# Patient Record
Sex: FEMALE | Race: WHITE | Employment: FULL TIME | URBAN - NONMETROPOLITAN AREA
[De-identification: names, ages, dates, MRNs, and addresses within clinical notes are randomized per-mention and may not be internally consistent; named-entity substitution may affect disease eponyms.]

---

## 2020-12-18 NOTE — PROGRESS NOTES
spond,         Review of Systems    Review of Systems   Constitutional: Positive for fatigue. Negative for diaphoresis and fever. HENT: Negative for congestion, hearing loss and nosebleeds. Eyes: Negative. Negative for pain and redness. Respiratory: Negative for cough, shortness of breath and wheezing. Cardiovascular: Negative. Negative for chest pain. Gastrointestinal: Negative for constipation, diarrhea, nausea and vomiting. Genitourinary: Negative for difficulty urinating, frequency and hematuria. Musculoskeletal: Positive for arthralgias, joint swelling and myalgias. Skin: Negative for rash. Neurological: Positive for weakness. Negative for dizziness, numbness and headaches. Hematological: Does not bruise/bleed easily. Psychiatric/Behavioral: Negative for agitation and sleep disturbance. The patient is nervous/anxious. PAST MEDICAL HISTORY     has no past medical history on file. PAST SURGICAL HISTORY     has a past surgical history that includes Hysterectomy, total abdominal (2006). FAMILY HISTORY      Family History   Problem Relation Age of Onset    Heart Disease Father        SOCIAL HISTORY     reports that she has never smoked. She has never used smokeless tobacco. She reports that she does not drink alcohol or use drugs. ALLERGIES     Allergies   Allergen Reactions    Latex     Asa [Aspirin]      Restless itchy    Codeine      shakes       CURRENT MEDICATIONS      Current Outpatient Medications:     gabapentin (NEURONTIN) 400 MG capsule, Take 400 mg by mouth 3 times daily. , Disp: , Rfl:     venlafaxine (EFFEXOR XR) 75 MG extended release capsule, Take 75 mg by mouth daily, Disp: , Rfl:     estradiol (ESTRACE) 2 MG tablet, Take 2 mg by mouth daily, Disp: , Rfl:     bisoprolol (ZEBETA) 5 MG tablet, Take 5 mg by mouth daily, Disp: , Rfl:     Multiple Vitamins-Minerals (THERAPEUTIC MULTIVITAMIN-MINERALS) tablet, Take 1 tablet by mouth daily, Disp: , Rfl:   naproxen (NAPROSYN) 250 MG tablet, Take 250 mg by mouth 3 times daily as needed for Pain, Disp: , Rfl:     PHYSICAL EXAMINATION / OBJECTIVE     Objective:  /64 (Site: Left Upper Arm, Position: Sitting, Cuff Size: Medium Adult)   Pulse 62   Temp 96.5 °F (35.8 °C)   Wt 122 lb (55.3 kg)   SpO2 99%     General Appearance:   alert and oriented to person, place and time well-developed and well nourished  Physch : appropriate affects ,   Head:  Normocephalic and atraumatic  Eyes: No gross abnormalities. ,  PERRL, Sclera nonicteric, conjunctiva non-INJECTED  ENT:  MMM,  no deformities , NO oral/nasal sores, Non-tender sinuses. Neck:  Neck supple, Non-tender, No  mass, thyromegaly,    Lymph:  No cervical  or  supraclavicular lymph node swelling. Pulmonary/Chest:  CTA bilat. , normal air movement, no respiratory distress  Cardiovascular:  Normal rate, + S1 and S2,  NO murmurs , rubs, gallups,     :  Deferred   Abd/GI: Deferred   Neurologic:  gait and coordination normal and speech normal  Skin:  Cyanosis , redness,palor of hands and feet    hand type skin changes bilat. Nail capillary dilation left 3,4 finger. Extremities:  No clubbing ,     Musculoskeletal:  Intact  ROM bilat upper and lower ext. 4/5 Strength , shoulder, hips. Upper extremities: SHOULDERS tender bilat ,      ELBOWS - tender ,    WRISTS  NT, NS,      HANDS/FINGERS - Tender: MCPs right 1,2 PIPs, right 2,3. DIPs Right 2nd. Lower extremities: HIPS tender bilat, + IGNACIA, FADIR , KNEES  NT, NS, Negative bilateral medial/latearl compression, anterior/posterior draw, Glen and patellar grind testing  , ANKLES NT, NS , FEET :  Tender MTPS bilat, mid sole bilat. + MTP compression bilat. Spine:  C-spine, T-spine & L-spine:   tender ,  ROM   ,   shober,  susan,  Occiput to wall ,  SLR/Cross SLR.       KEY:  Tender :  T  Swelling: S  Non-tender : NT  No swelling: NS           RAPID3 Composite Score MDHAQ (0-10) + Patient pain VAS (0-10): + Patient global assessment VAS (0-10):     12/21/2020 --- RAPID 3:  +  +  =      Remission: <3  Low Disease Activity: <6  Moderate Disease Activity: >=6 and <=12  High Disease Activity: >12    LABS        CBC  No results found for: WBC, RBC, HGB, HCT, MCV, MCH, MCHC, RDW, PLT    CMP  No results found for: CALCIUM, LABALBU, PROT, NA, K, CO2, CL, BUN, CREATININE, ALKPHOS, ALT, AST    HgBA1c: No components found for: HGBA1C    No results found for: TSHFT4, TSH  No results found for: VITD25      No results found for: ANASCRN  No results found for: SSA  No results found for: SSB  No results found for: ANTI-SMITH  No results found for: DSDNAAB   No results found for: ANTIRNP  No results found for: C3, C4  No results found for: CCPAB  No results found for: RF    No components found for: CANCASCRN, APANCASCRN  No results found for: SEDRATE  No results found for: CRP    RADIOLOGY:       ASSESSMENT/PLAN    Assessment   Plan     1. BRANDON positive    2. Fibromyalgia    3. Polyarthralgia    4. Myalgia    5. Other fatigue      1. BRANDON positive 1:80  2. Polyarthralgia  3. Myalgia  4. Other fatigue   -  genearlized arthralgia, myalgia with raynauds type color changes of the hands w/ nail capillary changes, bilateral symm. weakness,  type hand and prior hx of low + BRANDON. Evaluatino for underlying CTD (myositis , rheumatoid arthritis, SLE , Sjogrens) As outlined below. If negative will titrate medications for the fibromyalgia. - CBC Auto Differential; Future  - Comprehensive Metabolic Panel; Future  - Sedimentation Rate; Future  - C-Reactive Protein; Future  - Miscellaneous Sendout 1; Future - myositis ab panel.   - CK; Future  - Aldolase; Future  - Lactate Dehydrogenase; Future  - Hepatitis Panel, Acute; Future    5. Fibromyalgia  - Fibromyalgia is a non-life threatening non-rheumatic disease and is a diagnosis of exclusion.  We Discussed diagnosis, pathophysiology and management options with the patient. - I have discussed with her the importance of aerobic, non impact activity - swimming, yoga, danelle chi, walking or bicycle as well as healthy diet and sleep hygiene. A graded exercise program with physical therapy - We discussed  possible massage therapy and acupuncture. - Various FDA approved treatment such as low dose TCAs, SNRI (cymbalta, savella), gapenetoids, (neurotin, lyrica), flexeril, physical therapy with graded exercise program, cognitive behavioral thearpy. NoN-fda approved medications (muscle relaxants, ssri's) with the patient. - Use of pain medications (opiods/narcotic) can sometimes cause hyperasthesia in patients with FMS and not part of the standard treatment guidelines. - cont. Gabapentin 300mg q AM and 600mg qhs    - effexor daily     - CBC Auto Differential; Future  - Comprehensive Metabolic Panel; Future  - Sedimentation Rate; Future  - C-Reactive Protein; Future  - Miscellaneous Sendout 1; Future  - CK; Future  - Aldolase; Future  - Lactate Dehydrogenase; Future  - Hepatitis Panel, Acute; Future    6. Lateral epicondylitis - right --    - stretches and bracing discussed. Return in about 6 weeks (around 2/1/2021). Electronically signed by Bobby Zheng DO on 12/21/2020 at 10:48 AM    New Prescriptions    No medications on file         The risks and benefits of my recommendations, as well as other treatment options, benefits and side effects were discussed with the patient today. Questions were answered. Thank you for allowing me to participate in the care of this patient. Please call if there are any questions.

## 2020-12-21 ENCOUNTER — NURSE ONLY (OUTPATIENT)
Dept: LAB | Age: 55
End: 2020-12-21

## 2020-12-21 ENCOUNTER — OFFICE VISIT (OUTPATIENT)
Dept: RHEUMATOLOGY | Age: 55
End: 2020-12-21
Payer: COMMERCIAL

## 2020-12-21 VITALS
WEIGHT: 122 LBS | TEMPERATURE: 96.5 F | OXYGEN SATURATION: 99 % | SYSTOLIC BLOOD PRESSURE: 106 MMHG | HEART RATE: 62 BPM | DIASTOLIC BLOOD PRESSURE: 64 MMHG

## 2020-12-21 LAB
ALBUMIN SERPL-MCNC: 4.6 G/DL (ref 3.5–5.1)
ALP BLD-CCNC: 60 U/L (ref 38–126)
ALT SERPL-CCNC: 23 U/L (ref 11–66)
ANION GAP SERPL CALCULATED.3IONS-SCNC: 11 MEQ/L (ref 8–16)
AST SERPL-CCNC: 29 U/L (ref 5–40)
BASOPHILS # BLD: 0.7 %
BASOPHILS ABSOLUTE: 0 THOU/MM3 (ref 0–0.1)
BILIRUB SERPL-MCNC: 0.4 MG/DL (ref 0.3–1.2)
BUN BLDV-MCNC: 12 MG/DL (ref 7–22)
C-REACTIVE PROTEIN: 0.23 MG/DL (ref 0–1)
CALCIUM SERPL-MCNC: 9.8 MG/DL (ref 8.5–10.5)
CHLORIDE BLD-SCNC: 105 MEQ/L (ref 98–111)
CO2: 26 MEQ/L (ref 23–33)
CREAT SERPL-MCNC: 0.6 MG/DL (ref 0.4–1.2)
EOSINOPHIL # BLD: 3.8 %
EOSINOPHILS ABSOLUTE: 0.2 THOU/MM3 (ref 0–0.4)
ERYTHROCYTE [DISTWIDTH] IN BLOOD BY AUTOMATED COUNT: 13.2 % (ref 11.5–14.5)
ERYTHROCYTE [DISTWIDTH] IN BLOOD BY AUTOMATED COUNT: 45.9 FL (ref 35–45)
GFR SERPL CREATININE-BSD FRML MDRD: > 90 ML/MIN/1.73M2
GLUCOSE BLD-MCNC: 89 MG/DL (ref 70–108)
HAV IGM SER IA-ACNC: NEGATIVE
HCT VFR BLD CALC: 42.9 % (ref 37–47)
HEMOGLOBIN: 13.5 GM/DL (ref 12–16)
HEPATITIS B CORE IGM ANTIBODY: NEGATIVE
HEPATITIS B SURFACE ANTIGEN: NEGATIVE
HEPATITIS C ANTIBODY: NEGATIVE
IMMATURE GRANS (ABS): 0.02 THOU/MM3 (ref 0–0.07)
IMMATURE GRANULOCYTES: 0.4 %
LD: 171 U/L (ref 100–190)
LYMPHOCYTES # BLD: 38.5 %
LYMPHOCYTES ABSOLUTE: 1.7 THOU/MM3 (ref 1–4.8)
MCH RBC QN AUTO: 29.8 PG (ref 26–33)
MCHC RBC AUTO-ENTMCNC: 31.5 GM/DL (ref 32.2–35.5)
MCV RBC AUTO: 94.7 FL (ref 81–99)
MONOCYTES # BLD: 8.8 %
MONOCYTES ABSOLUTE: 0.4 THOU/MM3 (ref 0.4–1.3)
NUCLEATED RED BLOOD CELLS: 0 /100 WBC
PLATELET # BLD: 244 THOU/MM3 (ref 130–400)
PMV BLD AUTO: 12.1 FL (ref 9.4–12.4)
POTASSIUM SERPL-SCNC: 4 MEQ/L (ref 3.5–5.2)
RBC # BLD: 4.53 MILL/MM3 (ref 4.2–5.4)
SEDIMENTATION RATE, ERYTHROCYTE: 5 MM/HR (ref 0–20)
SEG NEUTROPHILS: 47.8 %
SEGMENTED NEUTROPHILS ABSOLUTE COUNT: 2.2 THOU/MM3 (ref 1.8–7.7)
SODIUM BLD-SCNC: 142 MEQ/L (ref 135–145)
TOTAL CK: 98 U/L (ref 30–135)
TOTAL PROTEIN: 7.3 G/DL (ref 6.1–8)
WBC # BLD: 4.5 THOU/MM3 (ref 4.8–10.8)

## 2020-12-21 PROCEDURE — G8421 BMI NOT CALCULATED: HCPCS | Performed by: INTERNAL MEDICINE

## 2020-12-21 PROCEDURE — G8484 FLU IMMUNIZE NO ADMIN: HCPCS | Performed by: INTERNAL MEDICINE

## 2020-12-21 PROCEDURE — 99204 OFFICE O/P NEW MOD 45 MIN: CPT | Performed by: INTERNAL MEDICINE

## 2020-12-21 PROCEDURE — G8427 DOCREV CUR MEDS BY ELIG CLIN: HCPCS | Performed by: INTERNAL MEDICINE

## 2020-12-21 RX ORDER — VENLAFAXINE HYDROCHLORIDE 75 MG/1
150 CAPSULE, EXTENDED RELEASE ORAL DAILY
COMMUNITY

## 2020-12-21 RX ORDER — ESTRADIOL 2 MG/1
2 TABLET ORAL DAILY
COMMUNITY

## 2020-12-21 RX ORDER — BISOPROLOL FUMARATE 5 MG/1
5 TABLET ORAL DAILY
COMMUNITY

## 2020-12-21 RX ORDER — NAPROXEN 250 MG/1
250 TABLET ORAL 3 TIMES DAILY PRN
COMMUNITY
End: 2022-08-30

## 2020-12-21 RX ORDER — M-VIT,TX,IRON,MINS/CALC/FOLIC 27MG-0.4MG
1 TABLET ORAL DAILY
COMMUNITY
End: 2022-08-30

## 2020-12-21 RX ORDER — GABAPENTIN 400 MG/1
400 CAPSULE ORAL 3 TIMES DAILY
COMMUNITY
End: 2021-02-03 | Stop reason: SDUPTHER

## 2020-12-21 SDOH — HEALTH STABILITY: MENTAL HEALTH: HOW MANY STANDARD DRINKS CONTAINING ALCOHOL DO YOU HAVE ON A TYPICAL DAY?: NOT ASKED

## 2020-12-21 SDOH — HEALTH STABILITY: MENTAL HEALTH: HOW OFTEN DO YOU HAVE A DRINK CONTAINING ALCOHOL?: NEVER

## 2020-12-21 ASSESSMENT — ENCOUNTER SYMPTOMS
EYE REDNESS: 0
EYE PAIN: 0
NAUSEA: 0
SHORTNESS OF BREATH: 0
CONSTIPATION: 0
EYES NEGATIVE: 1
COUGH: 0
VOMITING: 0
WHEEZING: 0
DIARRHEA: 0

## 2020-12-22 ENCOUNTER — TELEPHONE (OUTPATIENT)
Dept: RHEUMATOLOGY | Age: 55
End: 2020-12-22

## 2020-12-22 NOTE — TELEPHONE ENCOUNTER
----- Message from Kristen Herndon DO sent at 12/22/2020  7:39 AM EST -----  Blood testing revealing a mildly low white blood cell count of the remainder of the tests so far have been unrevealing. A few additional tests needs to help evaluate for systemic lupus and other similar inflammatory conditions are still pending. Once these have returned you will be notified of the results.

## 2020-12-23 LAB — ALDOLASE: 4.3 U/L (ref 1.5–8.1)

## 2020-12-30 NOTE — RESULT ENCOUNTER NOTE
Polyarthralgia  + BRANDON  + SSA ab.   -Polyarthralgia bilateral hands previously noted improvement with Plaquenil. Subsequent serologies revealing strong positive SSA. Raising concern for underlying connective tissue disease. Would like to discuss pursuit of DMARD such as methotrexate to improve clinical symptoms/arthralgias. Adelaida Walter

## 2020-12-31 ENCOUNTER — TELEPHONE (OUTPATIENT)
Dept: RHEUMATOLOGY | Age: 55
End: 2020-12-31

## 2020-12-31 RX ORDER — FOLIC ACID 1 MG/1
1 TABLET ORAL DAILY
Qty: 30 TABLET | Refills: 3 | Status: SHIPPED | OUTPATIENT
Start: 2020-12-31 | End: 2021-03-17 | Stop reason: SDUPTHER

## 2020-12-31 NOTE — TELEPHONE ENCOUNTER
Phoned her and reviewed. She is agreeable to trying the MTX and is aware of lab protocol.      Please sign pended orders

## 2020-12-31 NOTE — TELEPHONE ENCOUNTER
----- Message from Natasha March, DO sent at 12/30/2020  5:18 PM EST -----  Labs with + BRANDON and strong  + SSA Ab. Concern for possible connective tissue disease. Would like to discuss trial of methotrexate given her symptoms and prior improvement with plaquenil prior to developing tinnitus related to the medication. Side effects associated methotrexate include methotrexate induced pneumonitis, liver injury, low blood counts, nausea, vomiting, hair thinning headaches, fatigue, kidney injury. Blood testing every 4 weeks would be needed either initially to evaluate for some of these potential side effects. The medications given once weekly and taking with a daily folic acid to help prevent some side effects including the low blood counts and liver injury.

## 2021-01-19 NOTE — TELEPHONE ENCOUNTER
DOLV: 12/21/20  DONV: 2/3/21  LAST LAB DRAW: 12/31/20  LAST TB TEST: na    Lab Results   Component Value Date     12/21/2020    K 4.0 12/21/2020     12/21/2020    CO2 26 12/21/2020    BUN 12 12/21/2020    CREATININE 0.6 12/21/2020    GLUCOSE 89 12/21/2020    CALCIUM 9.8 12/21/2020    PROT 7.3 12/21/2020    LABALBU 4.6 12/21/2020    BILITOT 0.4 12/21/2020    ALKPHOS 60 12/21/2020    AST 29 12/21/2020    ALT 23 12/21/2020    LABGLOM >90 12/21/2020       Recent Labs     12/21/20  1149   WBC 4.5*   HGB 13.5   HCT 42.9   MCV 94.7          Lab Results   Component Value Date    SEDRATE 5 12/21/2020       Lab Results   Component Value Date    CRP 0.23 12/21/2020

## 2021-01-19 NOTE — TELEPHONE ENCOUNTER
Please call and inform the patient that they need to have labs done prior to having their medication refilled.

## 2021-01-19 NOTE — TELEPHONE ENCOUNTER
Olivia Whiteside called requesting a refill on the following medications:  Requested Prescriptions     Pending Prescriptions Disp Refills    methotrexate (RHEUMATREX) 2.5 MG chemo tablet 16 tablet 0     Sig: Take 4 tablets one day per week     Pharmacy verified: [id number] in [address] [place] [address]  . pv      Date of last visit: 12/21/2020  Date of next visit (if applicable): 9/9/3451

## 2021-01-20 NOTE — TELEPHONE ENCOUNTER
Spoke to Reinaldo Hernandez and informed to have labs drawn prior to med refill. States understanding.

## 2021-01-26 LAB
ABSOLUTE BASO #: 0 X10E9/L (ref 0–0.2)
ABSOLUTE EOS #: 0.3 X10E9/L (ref 0–0.4)
ABSOLUTE LYMPH #: 1.9 X10E9/L (ref 1–3.5)
ABSOLUTE MONO #: 0.5 X10E9/L (ref 0–0.9)
ABSOLUTE NEUT #: 2.5 X10E9/L (ref 1.5–6.6)
ALBUMIN SERPL-MCNC: 4.5 G/DL (ref 3.2–5.3)
ALK PHOSPHATASE: 57 U/L (ref 39–130)
ALT SERPL-CCNC: 20 U/L (ref 0–31)
ANION GAP SERPL CALCULATED.3IONS-SCNC: 8 MMOL/L (ref 5–15)
AST SERPL-CCNC: 23 U/L (ref 0–41)
BASOPHILS RELATIVE PERCENT: 0.4 %
BILIRUB SERPL-MCNC: 0.7 MG/DL (ref 0.3–1.2)
BUN BLDV-MCNC: 15 MG/DL (ref 5–23)
C-REACTIVE PROTEIN: 0.2 MG/DL (ref 0–0.74)
CALCIUM SERPL-MCNC: 9.5 MG/DL (ref 8.5–10.5)
CHLORIDE BLD-SCNC: 103 MMOL/L (ref 98–109)
CO2: 30 MMOL/L (ref 22–32)
CREAT SERPL-MCNC: 0.74 MG/DL (ref 0.4–1)
EGFR AFRICAN AMERICAN: >60 ML/MIN/1.73SQ.M
EGFR IF NONAFRICAN AMERICAN: >60 ML/MIN/1.73SQ.M
EOSINOPHILS RELATIVE PERCENT: 5.1 %
GLUCOSE: 84 MG/DL (ref 65–99)
HCT VFR BLD CALC: 40.6 % (ref 35–47)
HEMOGLOBIN: 13.3 G/DL (ref 11.7–15.5)
LYMPHOCYTE %: 36.6 %
MCH RBC QN AUTO: 29.9 PG (ref 27–34)
MCHC RBC AUTO-ENTMCNC: 32.7 G/DL (ref 32–36)
MCV RBC AUTO: 92 FL (ref 80–100)
MONOCYTES # BLD: 9.6 %
NEUTROPHILS RELATIVE PERCENT: 48.3 %
PDW BLD-RTO: 14.1 % (ref 11.5–15)
PLATELETS: 219 X10E9/L (ref 150–450)
PMV BLD AUTO: 10.4 FL (ref 7–12)
POTASSIUM SERPL-SCNC: 4 MMOL/L (ref 3.5–5)
RBC: 4.43 X10E12/L (ref 3.8–5.2)
SEDIMENTATION RATE, ERYTHROCYTE: 8 MM/H (ref 0–30)
SODIUM BLD-SCNC: 141 MMOL/L (ref 134–146)
TOTAL PROTEIN: 6.9 G/DL (ref 6–8)
WBC: 5.2 X10E9/L (ref 4–11)

## 2021-01-27 ENCOUNTER — TELEPHONE (OUTPATIENT)
Dept: RHEUMATOLOGY | Age: 56
End: 2021-01-27

## 2021-01-27 NOTE — TELEPHONE ENCOUNTER
----- Message from JOVAN Osborne CNP sent at 1/26/2021  9:30 AM EST -----  No significant lab abnormalities. Medication refilled. Repeat labs in 4 weeks x2.

## 2021-02-03 ENCOUNTER — OFFICE VISIT (OUTPATIENT)
Dept: RHEUMATOLOGY | Age: 56
End: 2021-02-03
Payer: COMMERCIAL

## 2021-02-03 VITALS
SYSTOLIC BLOOD PRESSURE: 108 MMHG | TEMPERATURE: 96.5 F | DIASTOLIC BLOOD PRESSURE: 58 MMHG | OXYGEN SATURATION: 96 % | HEART RATE: 67 BPM | WEIGHT: 125.8 LBS

## 2021-02-03 DIAGNOSIS — M79.7 FIBROMYALGIA: ICD-10-CM

## 2021-02-03 DIAGNOSIS — Z51.81 MEDICATION MONITORING ENCOUNTER: ICD-10-CM

## 2021-02-03 DIAGNOSIS — R76.8 SS-A ANTIBODY POSITIVE: ICD-10-CM

## 2021-02-03 DIAGNOSIS — I73.00 RAYNAUD'S PHENOMENON WITHOUT GANGRENE: ICD-10-CM

## 2021-02-03 DIAGNOSIS — R76.8 ANA POSITIVE: Primary | ICD-10-CM

## 2021-02-03 PROCEDURE — 3017F COLORECTAL CA SCREEN DOC REV: CPT | Performed by: NURSE PRACTITIONER

## 2021-02-03 PROCEDURE — G8427 DOCREV CUR MEDS BY ELIG CLIN: HCPCS | Performed by: NURSE PRACTITIONER

## 2021-02-03 PROCEDURE — G8421 BMI NOT CALCULATED: HCPCS | Performed by: NURSE PRACTITIONER

## 2021-02-03 PROCEDURE — 1036F TOBACCO NON-USER: CPT | Performed by: NURSE PRACTITIONER

## 2021-02-03 PROCEDURE — 99214 OFFICE O/P EST MOD 30 MIN: CPT | Performed by: NURSE PRACTITIONER

## 2021-02-03 PROCEDURE — G8484 FLU IMMUNIZE NO ADMIN: HCPCS | Performed by: NURSE PRACTITIONER

## 2021-02-03 RX ORDER — GABAPENTIN 400 MG/1
400 CAPSULE ORAL 3 TIMES DAILY
Qty: 90 CAPSULE | Refills: 5 | Status: SHIPPED | OUTPATIENT
Start: 2021-02-03 | Stop reason: SDUPTHER

## 2021-02-03 ASSESSMENT — ENCOUNTER SYMPTOMS
EYE ITCHING: 0
TROUBLE SWALLOWING: 1
BACK PAIN: 1
SHORTNESS OF BREATH: 0
NAUSEA: 0
COUGH: 0
DIARRHEA: 0
ABDOMINAL PAIN: 0
CONSTIPATION: 0
EYE PAIN: 0

## 2021-02-03 NOTE — PROGRESS NOTES
[facility] RHEUMATOLOGY FOLLOW UP NOTE       Date Of Service: 2/3/2021  Provider: JOVAN Steen CNP    Name: Dominic Handy   MRN: [id number]    Chief Complaint(s)     Chief Complaint   Patient presents with    Follow-up     6week f/u        History of Present Illness (HPI)     Dominic Handy  is a(n)55 y.o. female with a hx of HTN, HLD, cervicalgia, fibromyalgia, +BRANDON, osteoarthritis, impingement syndrome of left shoulder here for the f/u evaluation of + BRANDON, fibromyalgia     Interval hx:    - started methotrexate about 1 month ago- tolerating well    pain affecting the fingers, right wrist, right elbow, right shoulder, hips, knees, ankles, toes, neck  Pain on a scale 0-10: 6/10  Type of pain: constant  Timing:mornings  Aggravating factors: increased use, activity, cold exposure  Alleviating factors: naproxen, gabapentin    Associated symptoms:  denies swelling/  Redness/ warmth, denies AM stiffness         REVIEW OF SYSTEMS: (ROS)    Review of Systems   Constitutional: Negative for fatigue, fever and unexpected weight change. HENT: Positive for tinnitus and trouble swallowing. Negative for congestion. Eyes: Negative for pain and itching. Respiratory: Negative for cough and shortness of breath. Cardiovascular: Negative for chest pain and leg swelling. Gastrointestinal: Negative for abdominal pain, constipation, diarrhea and nausea. Endocrine: Negative for cold intolerance and heat intolerance. Genitourinary: Negative for difficulty urinating, frequency and urgency. Musculoskeletal: Positive for arthralgias, back pain, myalgias and neck pain. Negative for joint swelling. Skin: Negative for rash. Neurological: Positive for numbness and headaches. Negative for dizziness and weakness. Psychiatric/Behavioral: The patient is nervous/anxious. PAST MEDICAL HISTORY    History reviewed. No pertinent past medical history.     PAST SURGICAL HISTORY      Past Surgical History:   Procedure Laterality Date    HYSTERECTOMY, TOTAL ABDOMINAL  2006    bleeding, endomedtriosis       FAMILY HISTORY      Family History   Problem Relation Age of Onset    Heart Disease Father        SOCIAL HISTORY      Social History     Tobacco History     Smoking Status  Never Smoker    Smokeless Tobacco Use  Never Used          Alcohol History     Alcohol Use Status  Never          Drug Use     Drug Use Status  Never          Sexual Activity     Sexually Active  Not Asked                ALLERGIES     Allergies   Allergen Reactions    Latex     Asa [Aspirin]      Restless itchy    Codeine      shakes       CURRENT MEDICATIONS      Current Outpatient Medications   Medication Sig Dispense Refill    gabapentin (NEURONTIN) 400 MG capsule Take 1 capsule by mouth 3 times daily for 180 days. 90 capsule 5    methotrexate (RHEUMATREX) 2.5 MG chemo tablet Take 4 tablets one day per week 16 tablet 0    folic acid (FOLVITE) 1 MG tablet Take 1 tablet by mouth daily 30 tablet 3    venlafaxine (EFFEXOR XR) 75 MG extended release capsule Take 75 mg by mouth daily      estradiol (ESTRACE) 2 MG tablet Take 2 mg by mouth daily      bisoprolol (ZEBETA) 5 MG tablet Take 5 mg by mouth daily      Multiple Vitamins-Minerals (THERAPEUTIC MULTIVITAMIN-MINERALS) tablet Take 1 tablet by mouth daily      naproxen (NAPROSYN) 250 MG tablet Take 250 mg by mouth 3 times daily as needed for Pain       No current facility-administered medications for this visit. PHYSICAL EXAMINATION / OBJECTIVE   Objective:  BP (!) 108/58 (Site: Left Upper Arm, Position: Sitting, Cuff Size: Medium Adult)   Pulse 67   Temp 96.5 °F (35.8 °C)   Wt 125 lb 12.8 oz (57.1 kg)   SpO2 96%     Physical Exam  Vitals signs reviewed. Constitutional:       Appearance: She is well-developed. Neck:      Musculoskeletal: Normal range of motion and neck supple. Cardiovascular:      Rate and Rhythm: Normal rate and regular rhythm.    Pulmonary: results found for: CCPAB  No results found for: RF    No components found for: CANCASCRN, APANCASCRN  Lab Results   Component Value Date    SEDRATE 8 01/25/2021     Lab Results   Component Value Date    CRP 0.2 01/25/2021       RADIOLOGY:         ASSESSMENT/PLAN    Assessment   Plan     Undifferentiated connective tissue disease  - + BRANDON, strong positive SSA, polyarthralgia,  type hand changes, raynauds   - methotrexate 10 mg PO weekly (started 8/4/6773)   - folic acid 1 mg daily    Raynauds phenomenon  - + color changes of hands. + nail capillary changes. - continue conservative measures    Fibromyalgia   - continue gabapentin 400 mg TID    Lateral epicondylitis: right   - bracing and stretching discussed    Medication monitoring   - CBC, CMP, sed rate, CRP q 4 weeks x2      No follow-ups on file. Electronically signed by JOVAN Gracia CNP on 2/3/2021 at 10:13 AM    New Prescriptions    No medications on file       Thank you for allowing me to participate in the care of this patient. Please call if there are any questions.

## 2021-02-25 ENCOUNTER — TELEPHONE (OUTPATIENT)
Dept: RHEUMATOLOGY | Age: 56
End: 2021-02-25

## 2021-02-25 DIAGNOSIS — M35.9 CONNECTIVE TISSUE DISEASE (HCC): Primary | ICD-10-CM

## 2021-02-25 LAB
ABSOLUTE BASO #: 0.1 X10E9/L (ref 0–0.2)
ABSOLUTE EOS #: 0.3 X10E9/L (ref 0–0.4)
ABSOLUTE LYMPH #: 2.5 X10E9/L (ref 1–3.5)
ABSOLUTE MONO #: 0.7 X10E9/L (ref 0–0.9)
ABSOLUTE NEUT #: 3.4 X10E9/L (ref 1.5–6.6)
ALBUMIN SERPL-MCNC: 4.4 G/DL (ref 3.2–5.3)
ALK PHOSPHATASE: 64 U/L (ref 39–130)
ALT SERPL-CCNC: 17 U/L (ref 0–31)
ANION GAP SERPL CALCULATED.3IONS-SCNC: 9 MMOL/L (ref 5–15)
AST SERPL-CCNC: 20 U/L (ref 0–41)
BASOPHILS RELATIVE PERCENT: 0.9 %
BILIRUB SERPL-MCNC: 0.3 MG/DL (ref 0.3–1.2)
BUN BLDV-MCNC: 16 MG/DL (ref 5–23)
C-REACTIVE PROTEIN: 0.3 MG/DL (ref 0–0.74)
CALCIUM SERPL-MCNC: 9.4 MG/DL (ref 8.5–10.5)
CHLORIDE BLD-SCNC: 102 MMOL/L (ref 98–109)
CO2: 30 MMOL/L (ref 22–32)
CREAT SERPL-MCNC: 0.8 MG/DL (ref 0.4–1)
EGFR AFRICAN AMERICAN: >60 ML/MIN/1.73SQ.M
EGFR IF NONAFRICAN AMERICAN: >60 ML/MIN/1.73SQ.M
EOSINOPHILS RELATIVE PERCENT: 4.3 %
GLUCOSE: 84 MG/DL (ref 65–99)
HCT VFR BLD CALC: 39.9 % (ref 35–47)
HEMOGLOBIN: 13 G/DL (ref 11.7–15.5)
LYMPHOCYTE %: 36.3 %
MCH RBC QN AUTO: 30.2 PG (ref 27–34)
MCHC RBC AUTO-ENTMCNC: 32.7 G/DL (ref 32–36)
MCV RBC AUTO: 92 FL (ref 80–100)
MONOCYTES # BLD: 9.8 %
NEUTROPHILS RELATIVE PERCENT: 48.7 %
PDW BLD-RTO: 14.3 % (ref 11.5–15)
PLATELETS: 211 X10E9/L (ref 150–450)
PMV BLD AUTO: 10.9 FL (ref 7–12)
POTASSIUM SERPL-SCNC: 4.2 MMOL/L (ref 3.5–5)
RBC: 4.32 X10E12/L (ref 3.8–5.2)
SEDIMENTATION RATE, ERYTHROCYTE: 6 MM/H (ref 0–30)
SODIUM BLD-SCNC: 141 MMOL/L (ref 134–146)
TOTAL PROTEIN: 7.2 G/DL (ref 6–8)
WBC: 6.9 X10E9/L (ref 4–11)

## 2021-02-25 NOTE — TELEPHONE ENCOUNTER
----- Message from Wilian Mcelroy DO sent at 2/25/2021  9:58 AM EST -----  The labs are stable compared with the prior evaluation.  Please continue current medications and have your labs repeated in 4 weeks x1

## 2021-02-25 NOTE — PROGRESS NOTES
Diagnosis Orders   1. Connective tissue disease (HCC)  methotrexate (RHEUMATREX) 2.5 MG chemo tablet     2.  Medication monitoring:      CBC, CMP, ESR, CRP q 4 weeks x 1

## 2021-03-17 ENCOUNTER — OFFICE VISIT (OUTPATIENT)
Dept: RHEUMATOLOGY | Age: 56
End: 2021-03-17
Payer: COMMERCIAL

## 2021-03-17 VITALS
DIASTOLIC BLOOD PRESSURE: 62 MMHG | BODY MASS INDEX: 21.85 KG/M2 | HEIGHT: 64 IN | HEART RATE: 84 BPM | WEIGHT: 128 LBS | SYSTOLIC BLOOD PRESSURE: 104 MMHG | TEMPERATURE: 97 F | OXYGEN SATURATION: 97 %

## 2021-03-17 DIAGNOSIS — M35.9 CONNECTIVE TISSUE DISEASE (HCC): ICD-10-CM

## 2021-03-17 DIAGNOSIS — R76.8 SS-A ANTIBODY POSITIVE: ICD-10-CM

## 2021-03-17 DIAGNOSIS — M79.7 FIBROMYALGIA: ICD-10-CM

## 2021-03-17 DIAGNOSIS — Z51.81 MEDICATION MONITORING ENCOUNTER: ICD-10-CM

## 2021-03-17 DIAGNOSIS — R76.8 ANA POSITIVE: ICD-10-CM

## 2021-03-17 DIAGNOSIS — I73.00 RAYNAUD'S PHENOMENON WITHOUT GANGRENE: Primary | ICD-10-CM

## 2021-03-17 PROCEDURE — 1036F TOBACCO NON-USER: CPT | Performed by: NURSE PRACTITIONER

## 2021-03-17 PROCEDURE — G8484 FLU IMMUNIZE NO ADMIN: HCPCS | Performed by: NURSE PRACTITIONER

## 2021-03-17 PROCEDURE — G8420 CALC BMI NORM PARAMETERS: HCPCS | Performed by: NURSE PRACTITIONER

## 2021-03-17 PROCEDURE — G8427 DOCREV CUR MEDS BY ELIG CLIN: HCPCS | Performed by: NURSE PRACTITIONER

## 2021-03-17 PROCEDURE — 3017F COLORECTAL CA SCREEN DOC REV: CPT | Performed by: NURSE PRACTITIONER

## 2021-03-17 PROCEDURE — 99214 OFFICE O/P EST MOD 30 MIN: CPT | Performed by: NURSE PRACTITIONER

## 2021-03-17 RX ORDER — FOLIC ACID 1 MG/1
1 TABLET ORAL DAILY
Qty: 30 TABLET | Refills: 3 | Status: SHIPPED | OUTPATIENT
Start: 2021-03-17 | End: 2021-04-21 | Stop reason: SDUPTHER

## 2021-03-17 ASSESSMENT — ENCOUNTER SYMPTOMS
SHORTNESS OF BREATH: 0
CONSTIPATION: 0
DIARRHEA: 0
COUGH: 0
EYE PAIN: 0
ABDOMINAL PAIN: 0
NAUSEA: 0
TROUBLE SWALLOWING: 1
BACK PAIN: 1
EYE ITCHING: 0

## 2021-03-17 NOTE — PROGRESS NOTES
Procedure Laterality Date    HYSTERECTOMY, TOTAL ABDOMINAL  2006    bleeding, endomedtriosis       FAMILY HISTORY      Family History   Problem Relation Age of Onset    Heart Disease Father        SOCIAL HISTORY      Social History     Tobacco History     Smoking Status  Never Smoker    Smokeless Tobacco Use  Never Used          Alcohol History     Alcohol Use Status  Never          Drug Use     Drug Use Status  Never          Sexual Activity     Sexually Active  Not Asked                ALLERGIES     Allergies   Allergen Reactions    Latex     Asa [Aspirin]      Restless itchy    Codeine      shakes       CURRENT MEDICATIONS      Current Outpatient Medications   Medication Sig Dispense Refill    methotrexate (RHEUMATREX) 2.5 MG chemo tablet Take 6 tablets by mouth once a week 24 tablet 0    folic acid (FOLVITE) 1 MG tablet Take 1 tablet by mouth daily 30 tablet 3    gabapentin (NEURONTIN) 400 MG capsule Take 1 capsule by mouth 3 times daily for 180 days. 90 capsule 5    venlafaxine (EFFEXOR XR) 75 MG extended release capsule Take 75 mg by mouth daily      estradiol (ESTRACE) 2 MG tablet Take 2 mg by mouth daily      bisoprolol (ZEBETA) 5 MG tablet Take 5 mg by mouth daily      Multiple Vitamins-Minerals (THERAPEUTIC MULTIVITAMIN-MINERALS) tablet Take 1 tablet by mouth daily      naproxen (NAPROSYN) 250 MG tablet Take 250 mg by mouth 3 times daily as needed for Pain       No current facility-administered medications for this visit. PHYSICAL EXAMINATION / OBJECTIVE   Objective:  /62 (Site: Left Upper Arm, Position: Sitting, Cuff Size: Medium Adult)   Pulse 84   Temp 97 °F (36.1 °C)   Ht 5' 4.25\" (1.632 m)   Wt 128 lb (58.1 kg)   SpO2 97%   BMI 21.80 kg/m²     Physical Exam  Vitals signs reviewed. Constitutional:       Appearance: She is well-developed. Neck:      Musculoskeletal: Normal range of motion and neck supple.    Cardiovascular:      Rate and Rhythm: Normal rate and regular rhythm. Pulmonary:      Effort: Pulmonary effort is normal.      Breath sounds: Normal breath sounds. Abdominal:      Palpations: Abdomen is soft. Tenderness: There is no abdominal tenderness. Skin:     General: Skin is warm and dry. Findings: No rash. Neurological:      Mental Status: She is alert and oriented to person, place, and time. Deep Tendon Reflexes: Reflexes are normal and symmetric. Psychiatric:         Thought Content:  Thought content normal.       Upper extremities:    SHOULDERS tender bilat, no swelling ,   ELBOWS tender & warmth right  WRISTS + tender right, no swelling,   HANDS/FINGERS    MCPs tender right 1-3, no swelling    PIPs + tender right 2-4, no swelling    Lower extremities:  HIPS tender bilat outer hips  KNEES nontender, no swelling  ANKLES nontender, no swelling   FEET : + MTP compression bilat       RAPID 3:   3/17/2021 --- RAPID 3: 1 + 6 + 5.5 = 12.5     Remission: <3  Low Disease Activity: <6  Moderate Disease Activity: >=6 and <=12  High Disease Activity: >12     LABS    CBC  Lab Results   Component Value Date    WBC 6.9 02/24/2021    WBC 4.5 12/21/2020    RBC 4.32 02/24/2021    HGB 13.0 02/24/2021    HCT 39.9 02/24/2021    MCV 92 02/24/2021    MCH 30.2 02/24/2021    MCHC 32.7 02/24/2021    RDW 14.3 02/24/2021     02/24/2021     12/21/2020       CMP  Lab Results   Component Value Date    CALCIUM 9.4 02/24/2021    LABALBU 4.4 02/24/2021    PROT 7.2 02/24/2021     02/24/2021    K 4.2 02/24/2021    CO2 30 02/24/2021     02/24/2021    BUN 16 02/24/2021    CREATININE 0.80 02/24/2021    ALKPHOS 64 02/24/2021    ALKPHOS 60 12/21/2020    ALT 17 02/24/2021    AST 20 02/24/2021       HgBA1c: No components found for: HGBA1C    No results found for: VITD25      No results found for: ANASCRN  No results found for: SSA  No results found for: SSB  No results found for: ANTI-SMITH  No results found for: DSDNAAB   No results found for: ANTIRNP  No results found for: C3, C4  No results found for: CCPAB  No results found for: RF    No components found for: CANCASCRN, APANCASCRN  Lab Results   Component Value Date    SEDRATE 6 02/24/2021     Lab Results   Component Value Date    CRP 0.3 02/24/2021       RADIOLOGY:         ASSESSMENT/PLAN    Assessment   Plan     Undifferentiated connective tissue disease  - + BRANDON, strong positive SSA, polyarthralgia,  type hand changes, raynauds   - increase methotrexate to 15 mg PO weekly   - folic acid 1 mg daily    Raynauds phenomenon  - + color changes of hands. + nail capillary changes. - continue conservative measures    Fibromyalgia   - continue gabapentin 400 mg TID    Lateral epicondylitis: right   - bracing and stretching discussed    Medication monitoring   - CBC, CMP, sed rate, CRP q 4 weeks x2 w/ methotrexate increase      No follow-ups on file. Electronically signed by JOVAN Carpenter CNP on 3/17/2021 at 2:52 PM    New Prescriptions    No medications on file       Thank you for allowing me to participate in the care of this patient. Please call if there are any questions.

## 2021-04-15 DIAGNOSIS — M35.9 CONNECTIVE TISSUE DISEASE (HCC): ICD-10-CM

## 2021-04-15 LAB
ABSOLUTE BASO #: 0 X10E9/L (ref 0–0.2)
ABSOLUTE EOS #: 0.4 X10E9/L (ref 0–0.4)
ABSOLUTE LYMPH #: 2.1 X10E9/L (ref 1–3.5)
ABSOLUTE MONO #: 0.6 X10E9/L (ref 0–0.9)
ABSOLUTE NEUT #: 2.5 X10E9/L (ref 1.5–6.6)
ALBUMIN SERPL-MCNC: 4.2 G/DL (ref 3.2–5.3)
ALK PHOSPHATASE: 53 U/L (ref 39–130)
ALT SERPL-CCNC: 19 U/L (ref 0–31)
ANION GAP SERPL CALCULATED.3IONS-SCNC: 9 MMOL/L (ref 5–15)
AST SERPL-CCNC: 19 U/L (ref 0–41)
BASOPHILS RELATIVE PERCENT: 0.5 %
BILIRUB SERPL-MCNC: 0.4 MG/DL (ref 0.3–1.2)
BUN BLDV-MCNC: 14 MG/DL (ref 5–23)
C-REACTIVE PROTEIN: 0.3 MG/DL (ref 0–0.74)
CALCIUM SERPL-MCNC: 8.7 MG/DL (ref 8.5–10.5)
CHLORIDE BLD-SCNC: 103 MMOL/L (ref 98–109)
CO2: 30 MMOL/L (ref 22–32)
CREAT SERPL-MCNC: 0.77 MG/DL (ref 0.4–1)
EGFR AFRICAN AMERICAN: >60 ML/MIN/1.73SQ.M
EGFR IF NONAFRICAN AMERICAN: >60 ML/MIN/1.73SQ.M
EOSINOPHILS RELATIVE PERCENT: 6.6 %
GLUCOSE: 76 MG/DL (ref 65–99)
HCT VFR BLD CALC: 39.1 % (ref 35–47)
HEMOGLOBIN: 13.1 G/DL (ref 11.7–15.5)
LYMPHOCYTE %: 37.7 %
MCH RBC QN AUTO: 31.1 PG (ref 27–34)
MCHC RBC AUTO-ENTMCNC: 33.5 G/DL (ref 32–36)
MCV RBC AUTO: 93 FL (ref 80–100)
MONOCYTES # BLD: 10.4 %
NEUTROPHILS RELATIVE PERCENT: 44.8 %
PDW BLD-RTO: 14.1 % (ref 11.5–15)
PLATELETS: 186 X10E9/L (ref 150–450)
PMV BLD AUTO: 11 FL (ref 7–12)
POTASSIUM SERPL-SCNC: 4.5 MMOL/L (ref 3.5–5)
RBC: 4.2 X10E12/L (ref 3.8–5.2)
SEDIMENTATION RATE, ERYTHROCYTE: 9 MM/H (ref 0–30)
SODIUM BLD-SCNC: 142 MMOL/L (ref 134–146)
TOTAL PROTEIN: 6.9 G/DL (ref 6–8)
WBC: 5.5 X10E9/L (ref 4–11)

## 2021-04-16 ENCOUNTER — TELEPHONE (OUTPATIENT)
Dept: RHEUMATOLOGY | Age: 56
End: 2021-04-16

## 2021-04-16 NOTE — TELEPHONE ENCOUNTER
----- Message from JOVAN Kwan CNP sent at 4/15/2021  4:30 PM EDT -----  No significant lab abnormalities. Methotrexate refilled. Repeat labs in 4 weeks x2.

## 2021-04-20 NOTE — TELEPHONE ENCOUNTER
Olivia Whiteside called requesting a refill on the following medications:  Requested Prescriptions     Pending Prescriptions Disp Refills    folic acid (FOLVITE) 1 MG tablet 30 tablet 3     Sig: Take 1 tablet by mouth daily     Pharmacy verified: [id number] in [facility]  .       Date of last visit: 3/17/2021  Date of next visit (if applicable): Visit date not found

## 2021-04-21 RX ORDER — FOLIC ACID 1 MG/1
1 TABLET ORAL DAILY
Qty: 30 TABLET | Refills: 3 | Status: SHIPPED | OUTPATIENT
Start: 2021-04-21 | End: 2021-08-18 | Stop reason: SDUPTHER

## 2021-05-13 LAB
ABSOLUTE BASO #: 0 X10E9/L (ref 0–0.2)
ABSOLUTE EOS #: 0.2 X10E9/L (ref 0–0.4)
ABSOLUTE LYMPH #: 2.2 X10E9/L (ref 1–3.5)
ABSOLUTE MONO #: 0.6 X10E9/L (ref 0–0.9)
ABSOLUTE NEUT #: 2.4 X10E9/L (ref 1.5–6.6)
ALBUMIN SERPL-MCNC: 4.3 G/DL (ref 3.2–5.3)
ALK PHOSPHATASE: 55 U/L (ref 39–130)
ALT SERPL-CCNC: 16 U/L (ref 0–31)
ANION GAP SERPL CALCULATED.3IONS-SCNC: 7 MMOL/L (ref 5–15)
AST SERPL-CCNC: 21 U/L (ref 0–41)
BASOPHILS RELATIVE PERCENT: 0.6 %
BILIRUB SERPL-MCNC: 0.4 MG/DL (ref 0.3–1.2)
BUN BLDV-MCNC: 14 MG/DL (ref 5–23)
C-REACTIVE PROTEIN: 0.3 MG/DL (ref 0–0.74)
CALCIUM SERPL-MCNC: 9 MG/DL (ref 8.5–10.5)
CHLORIDE BLD-SCNC: 103 MMOL/L (ref 98–109)
CO2: 28 MMOL/L (ref 22–32)
CREAT SERPL-MCNC: 0.72 MG/DL (ref 0.4–1)
EGFR AFRICAN AMERICAN: >60 ML/MIN/1.73SQ.M
EGFR IF NONAFRICAN AMERICAN: >60 ML/MIN/1.73SQ.M
EOSINOPHILS RELATIVE PERCENT: 4.4 %
GLUCOSE: 68 MG/DL (ref 65–99)
HCT VFR BLD CALC: 37.7 % (ref 35–47)
HEMOGLOBIN: 12.6 G/DL (ref 11.7–15.5)
LYMPHOCYTE %: 41.3 %
MCH RBC QN AUTO: 30.8 PG (ref 27–34)
MCHC RBC AUTO-ENTMCNC: 33.4 G/DL (ref 32–36)
MCV RBC AUTO: 92 FL (ref 80–100)
MONOCYTES # BLD: 10.5 %
NEUTROPHILS RELATIVE PERCENT: 43.2 %
PDW BLD-RTO: 13.9 % (ref 11.5–15)
PLATELETS: 247 X10E9/L (ref 150–450)
PMV BLD AUTO: 10.2 FL (ref 7–12)
POTASSIUM SERPL-SCNC: 4.3 MMOL/L (ref 3.5–5)
RBC: 4.09 X10E12/L (ref 3.8–5.2)
SEDIMENTATION RATE, ERYTHROCYTE: 10 MM/H (ref 0–30)
SODIUM BLD-SCNC: 138 MMOL/L (ref 134–146)
TOTAL PROTEIN: 6.9 G/DL (ref 6–8)
WBC: 5.4 X10E9/L (ref 4–11)

## 2021-05-14 DIAGNOSIS — M35.9 CONNECTIVE TISSUE DISEASE (HCC): ICD-10-CM

## 2021-05-18 ENCOUNTER — TELEPHONE (OUTPATIENT)
Dept: RHEUMATOLOGY | Age: 56
End: 2021-05-18

## 2021-05-18 NOTE — TELEPHONE ENCOUNTER
----- Message from Bradley January, APRN - CNP sent at 5/14/2021  8:32 AM EDT -----  No significant abnormalities. Methotrexate refilled. Repeat labs in 4 weeks x2.

## 2021-05-19 ENCOUNTER — OFFICE VISIT (OUTPATIENT)
Dept: RHEUMATOLOGY | Age: 56
End: 2021-05-19
Payer: COMMERCIAL

## 2021-05-19 VITALS
OXYGEN SATURATION: 98 % | SYSTOLIC BLOOD PRESSURE: 112 MMHG | DIASTOLIC BLOOD PRESSURE: 68 MMHG | WEIGHT: 128.8 LBS | HEIGHT: 64 IN | HEART RATE: 73 BPM | BODY MASS INDEX: 21.99 KG/M2

## 2021-05-19 DIAGNOSIS — Z51.81 MEDICATION MONITORING ENCOUNTER: ICD-10-CM

## 2021-05-19 DIAGNOSIS — I73.00 RAYNAUD'S PHENOMENON WITHOUT GANGRENE: ICD-10-CM

## 2021-05-19 DIAGNOSIS — M79.7 FIBROMYALGIA: ICD-10-CM

## 2021-05-19 DIAGNOSIS — M35.9 CONNECTIVE TISSUE DISEASE (HCC): Primary | ICD-10-CM

## 2021-05-19 PROCEDURE — 3017F COLORECTAL CA SCREEN DOC REV: CPT | Performed by: INTERNAL MEDICINE

## 2021-05-19 PROCEDURE — G8420 CALC BMI NORM PARAMETERS: HCPCS | Performed by: INTERNAL MEDICINE

## 2021-05-19 PROCEDURE — G8427 DOCREV CUR MEDS BY ELIG CLIN: HCPCS | Performed by: INTERNAL MEDICINE

## 2021-05-19 PROCEDURE — 99214 OFFICE O/P EST MOD 30 MIN: CPT | Performed by: INTERNAL MEDICINE

## 2021-05-19 PROCEDURE — 1036F TOBACCO NON-USER: CPT | Performed by: INTERNAL MEDICINE

## 2021-05-19 RX ORDER — AMLODIPINE BESYLATE 2.5 MG/1
2.5 TABLET ORAL DAILY
Qty: 30 TABLET | Refills: 3 | Status: SHIPPED | OUTPATIENT
Start: 2021-05-19 | End: 2021-09-08 | Stop reason: SDUPTHER

## 2021-05-19 ASSESSMENT — ENCOUNTER SYMPTOMS
CONSTIPATION: 0
COUGH: 0
EYE ITCHING: 0
ABDOMINAL PAIN: 0
BACK PAIN: 1
NAUSEA: 0
EYE PAIN: 0
DIARRHEA: 0
TROUBLE SWALLOWING: 1
SHORTNESS OF BREATH: 0

## 2021-05-19 NOTE — PROGRESS NOTES
[facility] RHEUMATOLOGY FOLLOW UP NOTE       Date Of Service: 5/19/2021  Provider: Chad Levi DO    Name: Shanda Guillen   MRN: [id number]    Chief Complaint(s)     Chief Complaint   Patient presents with    Follow-up     2 month f/u        History of Present Illness (HPI)     Shanda Guillen  is a(n)55 y.o. female with a hx of HTN, HLD, cervicalgia, fibromyalgia, +BRANDON, osteoarthritis, impingement syndrome of left shoulder here for the f/u evaluation of + BRANDON, fibromyalgia     Interval hx:     - tolerating methotrexate 15mg q wk, - continued genearlied pain, greastest in th ehands and feet. Constant variable pain in the hands and feet. Timing:mornings  Aggravating factors: increased use, activity, cold exposure  Alleviating factors: naproxen, gabapentin  Occasional tingling in the fingers. Swelling of fingers - constant   Am stiffness lasting 60 minute. Raynauds - active intermittent  Hands & feet with associated numbness/tingling     -- Worsening fatigue over the past couple weeks. -- not currently exercising.   -  + sicca sx's ongoing. REVIEW OF SYSTEMS: (ROS)    Review of Systems   Constitutional: Negative for fatigue, fever and unexpected weight change. HENT: Positive for tinnitus and trouble swallowing. Negative for congestion. Eyes: Negative for pain and itching. Respiratory: Negative for cough and shortness of breath. Cardiovascular: Negative for chest pain and leg swelling. Gastrointestinal: Negative for abdominal pain, constipation, diarrhea and nausea. Endocrine: Negative for cold intolerance and heat intolerance. Genitourinary: Negative for difficulty urinating, frequency and urgency. Musculoskeletal: Positive for arthralgias, back pain, myalgias and neck pain. Negative for joint swelling. Skin: Negative for rash. Neurological: Positive for numbness and headaches. Negative for dizziness and weakness.    Psychiatric/Behavioral: The patient is nervous/anxious. PAST MEDICAL HISTORY    History reviewed. No pertinent past medical history. PAST SURGICAL HISTORY      Past Surgical History:   Procedure Laterality Date    HYSTERECTOMY, TOTAL ABDOMINAL  2006    bleeding, endomedtriosis       FAMILY HISTORY      Family History   Problem Relation Age of Onset    Heart Disease Father        SOCIAL HISTORY      Social History     Tobacco History     Smoking Status  Never Smoker    Smokeless Tobacco Use  Never Used          Alcohol History     Alcohol Use Status  Never          Drug Use     Drug Use Status  Never          Sexual Activity     Sexually Active  Not Asked                ALLERGIES     Allergies   Allergen Reactions    Latex     Asa [Aspirin]      Restless itchy    Codeine      shakes       CURRENT MEDICATIONS      Current Outpatient Medications   Medication Sig Dispense Refill    methotrexate (RHEUMATREX) 2.5 MG chemo tablet Take 6 tablets by mouth once a week 24 tablet 0    folic acid (FOLVITE) 1 MG tablet Take 1 tablet by mouth daily 30 tablet 3    gabapentin (NEURONTIN) 400 MG capsule Take 1 capsule by mouth 3 times daily for 180 days. 90 capsule 5    venlafaxine (EFFEXOR XR) 75 MG extended release capsule Take 75 mg by mouth daily      estradiol (ESTRACE) 2 MG tablet Take 2 mg by mouth daily      bisoprolol (ZEBETA) 5 MG tablet Take 5 mg by mouth daily      Multiple Vitamins-Minerals (THERAPEUTIC MULTIVITAMIN-MINERALS) tablet Take 1 tablet by mouth daily      naproxen (NAPROSYN) 250 MG tablet Take 250 mg by mouth 3 times daily as needed for Pain       No current facility-administered medications for this visit. PHYSICAL EXAMINATION / OBJECTIVE   Objective:  /68 (Site: Left Upper Arm, Position: Sitting, Cuff Size: Medium Adult)   Pulse 73   Ht 5' 4.25\" (1.632 m)   Wt 128 lb 12.8 oz (58.4 kg)   SpO2 98%   BMI 21.94 kg/m²     Physical Exam  Vitals reviewed. Constitutional:       Appearance: She is well-developed. Cardiovascular:      Rate and Rhythm: Normal rate and regular rhythm. Pulmonary:      Effort: Pulmonary effort is normal.      Breath sounds: Normal breath sounds. Abdominal:      Palpations: Abdomen is soft. Tenderness: There is no abdominal tenderness. Musculoskeletal:      Cervical back: Normal range of motion and neck supple. Skin:     General: Skin is warm and dry. Findings: No rash. Neurological:      Mental Status: She is alert and oriented to person, place, and time. Deep Tendon Reflexes: Reflexes are normal and symmetric. Psychiatric:         Thought Content:  Thought content normal.       Upper extremities:    SHOULDERS tender Right   ELBOWS tender bilateral.   WRISTS Non-tender, sn   HANDS/FINGERS - Non-tender, No swelling     Lower extremities:  HIPS tender bilat outer hips  KNEES nontender, no swelling  ANKLES nontender, no swelling   FEET : Non-tender,       RAPID 3:   5/19/2021 --- RAPID 3: 2 + 5 + 5 = 12    Remission: <3  Low Disease Activity: <6  Moderate Disease Activity: >=6 and <=12  High Disease Activity: >12     LABS    CBC  Lab Results   Component Value Date    WBC 5.4 05/12/2021    WBC 4.5 12/21/2020    RBC 4.09 05/12/2021    HGB 12.6 05/12/2021    HCT 37.7 05/12/2021    MCV 92 05/12/2021    MCH 30.8 05/12/2021    MCHC 33.4 05/12/2021    RDW 13.9 05/12/2021     05/12/2021     12/21/2020       CMP  Lab Results   Component Value Date    CALCIUM 9.0 05/12/2021    LABALBU 4.3 05/12/2021    PROT 6.9 05/12/2021     05/12/2021    K 4.3 05/12/2021    CO2 28 05/12/2021     05/12/2021    BUN 14 05/12/2021    CREATININE 0.72 05/12/2021    ALKPHOS 55 05/12/2021    ALKPHOS 60 12/21/2020    ALT 16 05/12/2021    AST 21 05/12/2021       HgBA1c: No components found for: HGBA1C    No results found for: VITD25      No results found for: ANASCRN  No results found for: SSA  No results found for: SSB  No results found for: ANTI-SMITH  No results found for: DSDNAAB   No results found for: ANTIRNP  No results found for: C3, C4  No results found for: CCPAB  No results found for: RF    No components found for: CANCASCRN, APANCASCRN  Lab Results   Component Value Date    SEDRATE 10 05/12/2021     Lab Results   Component Value Date    CRP 0.3 05/12/2021       RADIOLOGY:         ASSESSMENT/PLAN    Assessment   Plan     Undifferentiated connective tissue disease  - + BRANDON, strong positive SSA, polyarthralgia,  type hand changes, raynauds   - increase methotrexate to 20 mg PO weekly,  folic acid 1 mg daily --     Raynauds phenomenon  - + color changes of hands. + nail capillary changes. - start norvasc 2.5 mg daily b/c ongoing pain and color changes. - may beneift from stopping the bisprolol as this can worsen the raynauds. Fibromyalgia   - gabapentin 400 mg THREE TIME DAILY   - encouraged exercise. Lateral epicondylitis: right   - bracing and stretching discussed    Medication monitoring   - CBC, CMP, sed rate, CRP q 4 weeks x 3 w/ methotrexate increase      No follow-ups on file. Electronically signed by Akira Fraire DO on 5/19/2021 at 8:41 AM    New Prescriptions    No medications on file       Thank you for allowing me to participate in the care of this patient. Please call if there are any questions.

## 2021-05-26 ENCOUNTER — TELEPHONE (OUTPATIENT)
Dept: RHEUMATOLOGY | Age: 56
End: 2021-05-26

## 2021-05-26 NOTE — TELEPHONE ENCOUNTER
Pt has been taking her Zebeta every other day as instructed and her blood pressure is now slightly higher. States that  its  128/85. Should she be concerned?

## 2021-06-09 ENCOUNTER — TELEPHONE (OUTPATIENT)
Dept: RHEUMATOLOGY | Age: 56
End: 2021-06-09

## 2021-06-09 NOTE — TELEPHONE ENCOUNTER
Surjit Mauricio called in with c/o of hands and feet swelling since the stop of Zebeta and the start of amlodipine. Denies n/t of feet.

## 2021-06-09 NOTE — TELEPHONE ENCOUNTER
Please hold amlodipine and let us know if the swelling of the hands and feet have improved.      I do not see any prior note from our office that instructed the patient to discontinue there is Christianu

## 2021-06-11 NOTE — TELEPHONE ENCOUNTER
Spoke to Dontrell with instructions to hold allopurinol as suggested,however she states that the swelling in her hands and feet has resolved at this time. She will continue the allopurinol and inform us of any new symptoms.

## 2021-07-01 DIAGNOSIS — M35.9 CONNECTIVE TISSUE DISEASE (HCC): ICD-10-CM

## 2021-07-01 LAB
ABSOLUTE BASO #: 0 X10E9/L (ref 0–0.2)
ABSOLUTE EOS #: 0.4 X10E9/L (ref 0–0.4)
ABSOLUTE LYMPH #: 2 X10E9/L (ref 1–3.5)
ABSOLUTE MONO #: 0.6 X10E9/L (ref 0–0.9)
ABSOLUTE NEUT #: 2 X10E9/L (ref 1.5–6.6)
ALBUMIN SERPL-MCNC: 4.1 G/DL (ref 3.2–5.3)
ALK PHOSPHATASE: 59 U/L (ref 39–130)
ALT SERPL-CCNC: 27 U/L (ref 0–31)
ANION GAP SERPL CALCULATED.3IONS-SCNC: 8 MMOL/L (ref 5–15)
AST SERPL-CCNC: 29 U/L (ref 0–41)
BASOPHILS RELATIVE PERCENT: 0.6 %
BILIRUB SERPL-MCNC: 0.5 MG/DL (ref 0.3–1.2)
BUN BLDV-MCNC: 14 MG/DL (ref 5–23)
C-REACTIVE PROTEIN: 0.5 MG/DL (ref 0–0.74)
CALCIUM SERPL-MCNC: 8.5 MG/DL (ref 8.5–10.5)
CHLORIDE BLD-SCNC: 105 MMOL/L (ref 98–109)
CO2: 28 MMOL/L (ref 22–32)
CREAT SERPL-MCNC: 0.69 MG/DL (ref 0.4–1)
EGFR AFRICAN AMERICAN: >60 ML/MIN/1.73SQ.M
EGFR IF NONAFRICAN AMERICAN: >60 ML/MIN/1.73SQ.M
EOSINOPHILS RELATIVE PERCENT: 7.4 %
GLUCOSE: 85 MG/DL (ref 65–99)
HCT VFR BLD CALC: 35.8 % (ref 35–47)
HEMOGLOBIN: 12 G/DL (ref 11.7–15.5)
LYMPHOCYTE %: 39.6 %
MCH RBC QN AUTO: 31.8 PG (ref 27–34)
MCHC RBC AUTO-ENTMCNC: 33.7 G/DL (ref 32–36)
MCV RBC AUTO: 95 FL (ref 80–100)
MONOCYTES # BLD: 13 %
NEUTROPHILS RELATIVE PERCENT: 39.4 %
PDW BLD-RTO: 14.4 % (ref 11.5–15)
PLATELETS: 229 X10E9/L (ref 150–450)
PMV BLD AUTO: 10.6 FL (ref 7–12)
POTASSIUM SERPL-SCNC: 4 MMOL/L (ref 3.5–5)
RBC: 3.79 X10E12/L (ref 3.8–5.2)
SEDIMENTATION RATE, ERYTHROCYTE: 11 MM/H (ref 0–30)
SODIUM BLD-SCNC: 141 MMOL/L (ref 134–146)
TOTAL PROTEIN: 6.8 G/DL (ref 6–8)
WBC: 5 X10E9/L (ref 4–11)

## 2021-07-02 ENCOUNTER — TELEPHONE (OUTPATIENT)
Dept: RHEUMATOLOGY | Age: 56
End: 2021-07-02

## 2021-07-29 LAB
ABSOLUTE BASO #: 0 X10E9/L (ref 0–0.2)
ABSOLUTE EOS #: 0.3 X10E9/L (ref 0–0.4)
ABSOLUTE LYMPH #: 1.5 X10E9/L (ref 1–3.5)
ABSOLUTE MONO #: 0.5 X10E9/L (ref 0–0.9)
ABSOLUTE NEUT #: 3 X10E9/L (ref 1.5–6.6)
ALBUMIN SERPL-MCNC: 4.2 G/DL (ref 3.2–5.3)
ALK PHOSPHATASE: 72 U/L (ref 39–130)
ALT SERPL-CCNC: 20 U/L (ref 0–31)
ANION GAP SERPL CALCULATED.3IONS-SCNC: 8 MMOL/L (ref 5–15)
AST SERPL-CCNC: 24 U/L (ref 0–41)
BASOPHILS RELATIVE PERCENT: 0.9 %
BILIRUB SERPL-MCNC: 0.4 MG/DL (ref 0.3–1.2)
BUN BLDV-MCNC: 18 MG/DL (ref 5–23)
C-REACTIVE PROTEIN: 0.5 MG/DL (ref 0–0.74)
CALCIUM SERPL-MCNC: 9.2 MG/DL (ref 8.5–10.5)
CHLORIDE BLD-SCNC: 105 MMOL/L (ref 98–109)
CO2: 30 MMOL/L (ref 22–32)
CREAT SERPL-MCNC: 0.78 MG/DL (ref 0.4–1)
EGFR AFRICAN AMERICAN: >60 ML/MIN/1.73SQ.M
EGFR IF NONAFRICAN AMERICAN: >60 ML/MIN/1.73SQ.M
EOSINOPHILS RELATIVE PERCENT: 5 %
GLUCOSE: 92 MG/DL (ref 65–99)
HCT VFR BLD CALC: 36.7 % (ref 35–47)
HEMOGLOBIN: 12.2 G/DL (ref 11.7–15.5)
LYMPHOCYTE %: 29 %
MCH RBC QN AUTO: 31.4 PG (ref 27–34)
MCHC RBC AUTO-ENTMCNC: 33.2 G/DL (ref 32–36)
MCV RBC AUTO: 95 FL (ref 80–100)
MONOCYTES # BLD: 9.3 %
NEUTROPHILS RELATIVE PERCENT: 55.8 %
PDW BLD-RTO: 14.2 % (ref 11.5–15)
PLATELETS: 240 X10E9/L (ref 150–450)
PMV BLD AUTO: 10.2 FL (ref 7–12)
POTASSIUM SERPL-SCNC: 4.3 MMOL/L (ref 3.5–5)
RBC: 3.88 X10E12/L (ref 3.8–5.2)
SEDIMENTATION RATE, ERYTHROCYTE: 6 MM/H (ref 0–30)
SODIUM BLD-SCNC: 143 MMOL/L (ref 134–146)
TOTAL PROTEIN: 6.9 G/DL (ref 6–8)
WBC: 5.3 X10E9/L (ref 4–11)

## 2021-08-02 ENCOUNTER — TELEPHONE (OUTPATIENT)
Dept: RHEUMATOLOGY | Age: 56
End: 2021-08-02

## 2021-08-02 NOTE — TELEPHONE ENCOUNTER
Patient is requesting to speak to someone regarding concerns she has about getting the covid19 vaccine. She mentioned questions about interactions with her current meds and which vaccine she should go with.   Please contact patient  [phone]    DOLV 05/19/2021 DONV 08/25/2021

## 2021-08-02 NOTE — TELEPHONE ENCOUNTER
Patient also left message on our nurse line with return call to her home number. Attempted to call her and inform to hold her Methotrexate x1 week after the first dose of her vaccine.  No recommendations on which vaccine she chooses    LM with female at home number to call back

## 2021-08-03 NOTE — TELEPHONE ENCOUNTER
----- Message from Brigid Medina DO sent at 7/29/2021 11:02 AM EDT -----  The labs are stable compared with the prior evaluation. Please continue current medications and have your labs repeated in 4 weeks.

## 2021-08-06 DIAGNOSIS — M35.9 CONNECTIVE TISSUE DISEASE (HCC): ICD-10-CM

## 2021-08-06 NOTE — TELEPHONE ENCOUNTER
Blaire Whiteside called requesting a refill on the following medications:  Requested Prescriptions     Pending Prescriptions Disp Refills    methotrexate (RHEUMATREX) 2.5 MG chemo tablet 32 tablet 0     Sig: Take 8 tablets by mouth once a week Take 4 tablets in the morning and 4 tablets in the evening once weekly     Pharmacy verified:  [facility]  [address]    Date of last visit: 05/19/21  Date of next visit (if applicable): 2/91/2514

## 2021-08-09 NOTE — TELEPHONE ENCOUNTER
DOLV: 5/19/21  DONV: 8/25/21  LAST LAB DRAW: 7/28/21  LAST TB TEST: na    Lab Results   Component Value Date     07/28/2021    K 4.3 07/28/2021     07/28/2021    CO2 30 07/28/2021    BUN 18 07/28/2021    CREATININE 0.78 07/28/2021    GLUCOSE 92 07/28/2021    CALCIUM 9.2 07/28/2021    PROT 6.9 07/28/2021    LABALBU 4.2 07/28/2021    BILITOT 0.4 07/28/2021    ALKPHOS 72 07/28/2021    AST 24 07/28/2021    ALT 20 07/28/2021    LABGLOM >90 12/21/2020       Recent Labs     07/28/21  1105   WBC 5.3   HGB 12.2   HCT 36.7   MCV 95          Lab Results   Component Value Date    SEDRATE 6 07/28/2021       Lab Results   Component Value Date    CRP 0.5 07/28/2021

## 2021-08-16 RX ORDER — GABAPENTIN 400 MG/1
400 CAPSULE ORAL 3 TIMES DAILY
Qty: 90 CAPSULE | Refills: 5 | Status: SHIPPED | OUTPATIENT
Start: 2021-08-16 | End: 2022-01-25

## 2021-08-17 NOTE — TELEPHONE ENCOUNTER
Olivia Whiteside called requesting a refill on the following medications:  Requested Prescriptions     Pending Prescriptions Disp Refills    folic acid (FOLVITE) 1 MG tablet 30 tablet 3     Sig: Take 1 tablet by mouth daily     Pharmacy verified:[facility]  [facility]      Date of last visit:   Date of next visit (if applicable): Visit date not found

## 2021-08-18 RX ORDER — FOLIC ACID 1 MG/1
1 TABLET ORAL DAILY
Qty: 30 TABLET | Refills: 3 | Status: SHIPPED | OUTPATIENT
Start: 2021-08-18 | End: 2021-12-21 | Stop reason: SDUPTHER

## 2021-09-01 ENCOUNTER — OFFICE VISIT (OUTPATIENT)
Dept: RHEUMATOLOGY | Age: 56
End: 2021-09-01
Payer: COMMERCIAL

## 2021-09-01 ENCOUNTER — TELEPHONE (OUTPATIENT)
Dept: RHEUMATOLOGY | Age: 56
End: 2021-09-01

## 2021-09-01 VITALS
DIASTOLIC BLOOD PRESSURE: 78 MMHG | HEIGHT: 64 IN | BODY MASS INDEX: 22.23 KG/M2 | HEART RATE: 54 BPM | WEIGHT: 130.2 LBS | SYSTOLIC BLOOD PRESSURE: 138 MMHG | OXYGEN SATURATION: 90 %

## 2021-09-01 DIAGNOSIS — R76.8 ANA POSITIVE: ICD-10-CM

## 2021-09-01 DIAGNOSIS — I73.00 RAYNAUD'S PHENOMENON WITHOUT GANGRENE: ICD-10-CM

## 2021-09-01 DIAGNOSIS — R76.8 SS-A ANTIBODY POSITIVE: ICD-10-CM

## 2021-09-01 DIAGNOSIS — Z51.81 MEDICATION MONITORING ENCOUNTER: ICD-10-CM

## 2021-09-01 DIAGNOSIS — M35.9 CONNECTIVE TISSUE DISEASE (HCC): ICD-10-CM

## 2021-09-01 DIAGNOSIS — M79.7 FIBROMYALGIA: ICD-10-CM

## 2021-09-01 DIAGNOSIS — M35.9 CONNECTIVE TISSUE DISEASE (HCC): Primary | ICD-10-CM

## 2021-09-01 LAB
ABSOLUTE BASO #: 0 X10E9/L (ref 0–0.2)
ABSOLUTE EOS #: 0.2 X10E9/L (ref 0–0.4)
ABSOLUTE LYMPH #: 1.7 X10E9/L (ref 1–3.5)
ABSOLUTE MONO #: 0.5 X10E9/L (ref 0–0.9)
ABSOLUTE NEUT #: 2.6 X10E9/L (ref 1.5–6.6)
ALBUMIN SERPL-MCNC: 4.2 G/DL (ref 3.2–5.3)
ALK PHOSPHATASE: 61 U/L (ref 39–130)
ALT SERPL-CCNC: 21 U/L (ref 0–31)
ANION GAP SERPL CALCULATED.3IONS-SCNC: 10 MMOL/L (ref 5–15)
AST SERPL-CCNC: 27 U/L (ref 0–41)
BASOPHILS RELATIVE PERCENT: 0.9 %
BILIRUB SERPL-MCNC: 0.6 MG/DL (ref 0.3–1.2)
BUN BLDV-MCNC: 10 MG/DL (ref 5–23)
C-REACTIVE PROTEIN: 0.4 MG/DL (ref 0–0.74)
CALCIUM SERPL-MCNC: 9.1 MG/DL (ref 8.5–10.5)
CHLORIDE BLD-SCNC: 103 MMOL/L (ref 98–109)
CO2: 28 MMOL/L (ref 22–32)
CREAT SERPL-MCNC: 0.66 MG/DL (ref 0.4–1)
EGFR AFRICAN AMERICAN: >60 ML/MIN/1.73SQ.M
EGFR IF NONAFRICAN AMERICAN: >60 ML/MIN/1.73SQ.M
EOSINOPHILS RELATIVE PERCENT: 4.5 %
GLUCOSE: 62 MG/DL (ref 65–99)
HCT VFR BLD CALC: 37 % (ref 35–47)
HEMOGLOBIN: 12.4 G/DL (ref 11.7–15.5)
LYMPHOCYTE %: 33.8 %
MCH RBC QN AUTO: 31.9 PG (ref 27–34)
MCHC RBC AUTO-ENTMCNC: 33.5 G/DL (ref 32–36)
MCV RBC AUTO: 95 FL (ref 80–100)
MONOCYTES # BLD: 10.2 %
NEUTROPHILS RELATIVE PERCENT: 50.6 %
PDW BLD-RTO: 14.4 % (ref 11.5–15)
PLATELETS: 234 X10E9/L (ref 150–450)
PMV BLD AUTO: 10.4 FL (ref 7–12)
POTASSIUM SERPL-SCNC: 3.9 MMOL/L (ref 3.5–5)
RBC: 3.89 X10E12/L (ref 3.8–5.2)
SEDIMENTATION RATE, ERYTHROCYTE: 9 MM/H (ref 0–30)
SODIUM BLD-SCNC: 141 MMOL/L (ref 134–146)
TOTAL PROTEIN: 6.9 G/DL (ref 6–8)
WBC: 5.1 X10E9/L (ref 4–11)

## 2021-09-01 PROCEDURE — G8427 DOCREV CUR MEDS BY ELIG CLIN: HCPCS | Performed by: NURSE PRACTITIONER

## 2021-09-01 PROCEDURE — 99214 OFFICE O/P EST MOD 30 MIN: CPT | Performed by: NURSE PRACTITIONER

## 2021-09-01 PROCEDURE — 3017F COLORECTAL CA SCREEN DOC REV: CPT | Performed by: NURSE PRACTITIONER

## 2021-09-01 PROCEDURE — G8420 CALC BMI NORM PARAMETERS: HCPCS | Performed by: NURSE PRACTITIONER

## 2021-09-01 PROCEDURE — 1036F TOBACCO NON-USER: CPT | Performed by: NURSE PRACTITIONER

## 2021-09-01 ASSESSMENT — ENCOUNTER SYMPTOMS
CONSTIPATION: 0
EYE PAIN: 0
SHORTNESS OF BREATH: 0
EYE ITCHING: 0
NAUSEA: 0
DIARRHEA: 0
BACK PAIN: 0
COUGH: 0
ABDOMINAL PAIN: 0
TROUBLE SWALLOWING: 0

## 2021-09-01 NOTE — RESULT ENCOUNTER NOTE
The labs are stable compared with the prior evaluation. Please continue current medications and have your labs repeated in 8 weeks and if the labs are stable by the next evaluation we will attempt to space the lab monitoring to every 12 weeks.

## 2021-09-08 DIAGNOSIS — I73.00 RAYNAUD'S PHENOMENON WITHOUT GANGRENE: ICD-10-CM

## 2021-09-08 RX ORDER — AMLODIPINE BESYLATE 2.5 MG/1
2.5 TABLET ORAL DAILY
Qty: 30 TABLET | Refills: 3 | Status: SHIPPED | OUTPATIENT
Start: 2021-09-08 | End: 2022-01-05

## 2021-10-30 LAB
ABSOLUTE BASO #: 0 X10E9/L (ref 0–0.2)
ABSOLUTE EOS #: 0.2 X10E9/L (ref 0–0.4)
ABSOLUTE LYMPH #: 1.7 X10E9/L (ref 1–3.5)
ABSOLUTE MONO #: 0.3 X10E9/L (ref 0–0.9)
ABSOLUTE NEUT #: 1.8 X10E9/L (ref 1.5–6.6)
ALBUMIN SERPL-MCNC: 4.2 G/DL (ref 3.2–5.3)
ALK PHOSPHATASE: 62 U/L (ref 39–130)
ALT SERPL-CCNC: 19 U/L (ref 0–31)
ANION GAP SERPL CALCULATED.3IONS-SCNC: 7 MMOL/L (ref 5–15)
AST SERPL-CCNC: 22 U/L (ref 0–41)
BASOPHILS RELATIVE PERCENT: 0.5 %
BILIRUB SERPL-MCNC: 0.5 MG/DL (ref 0.3–1.2)
BUN BLDV-MCNC: 12 MG/DL (ref 5–23)
C-REACTIVE PROTEIN: 0.3 MG/DL (ref 0–0.74)
CALCIUM SERPL-MCNC: 8.8 MG/DL (ref 8.5–10.5)
CHLORIDE BLD-SCNC: 103 MMOL/L (ref 98–109)
CO2: 30 MMOL/L (ref 22–32)
CREAT SERPL-MCNC: 0.75 MG/DL (ref 0.4–1)
EGFR AFRICAN AMERICAN: >60 ML/MIN/1.73SQ.M
EGFR IF NONAFRICAN AMERICAN: >60 ML/MIN/1.73SQ.M
EOSINOPHILS RELATIVE PERCENT: 5.4 %
GLUCOSE: 112 MG/DL (ref 65–99)
HCT VFR BLD CALC: 38.6 % (ref 35–47)
HEMOGLOBIN: 12.8 G/DL (ref 11.7–15.5)
LYMPHOCYTE %: 41.7 %
MCH RBC QN AUTO: 31.8 PG (ref 27–34)
MCHC RBC AUTO-ENTMCNC: 33.2 G/DL (ref 32–36)
MCV RBC AUTO: 96 FL (ref 80–100)
MONOCYTES # BLD: 7.8 %
NEUTROPHILS RELATIVE PERCENT: 44.6 %
PDW BLD-RTO: 14.5 % (ref 11.5–15)
PLATELETS: 201 X10E9/L (ref 150–450)
PMV BLD AUTO: 10.5 FL (ref 7–12)
POTASSIUM SERPL-SCNC: 4.1 MMOL/L (ref 3.5–5)
RBC: 4.03 X10E12/L (ref 3.8–5.2)
SEDIMENTATION RATE, ERYTHROCYTE: 8 MM/H (ref 0–30)
SODIUM BLD-SCNC: 140 MMOL/L (ref 134–146)
TOTAL PROTEIN: 7 G/DL (ref 6–8)
WBC: 4 X10E9/L (ref 4–11)

## 2021-11-01 DIAGNOSIS — M35.9 CONNECTIVE TISSUE DISEASE (HCC): ICD-10-CM

## 2021-11-01 DIAGNOSIS — Z51.81 MEDICATION MONITORING ENCOUNTER: Primary | ICD-10-CM

## 2021-12-20 NOTE — TELEPHONE ENCOUNTER
Olivia Whiteside called requesting a refill on the following medications:  Requested Prescriptions     Pending Prescriptions Disp Refills    folic acid (FOLVITE) 1 MG tablet 30 tablet 3     Sig: Take 1 tablet by mouth daily     Pharmacy verified:[facility]      Date of last visit:   Date of next visit (if applicable): 0/9/8268

## 2021-12-21 RX ORDER — FOLIC ACID 1 MG/1
1 TABLET ORAL DAILY
Qty: 30 TABLET | Refills: 3 | Status: SHIPPED | OUTPATIENT
Start: 2021-12-21 | End: 2022-01-05 | Stop reason: SDUPTHER

## 2022-01-05 ENCOUNTER — OFFICE VISIT (OUTPATIENT)
Dept: RHEUMATOLOGY | Age: 57
End: 2022-01-05
Payer: COMMERCIAL

## 2022-01-05 VITALS
DIASTOLIC BLOOD PRESSURE: 78 MMHG | OXYGEN SATURATION: 98 % | HEART RATE: 107 BPM | HEIGHT: 64 IN | SYSTOLIC BLOOD PRESSURE: 142 MMHG | BODY MASS INDEX: 21.95 KG/M2 | WEIGHT: 128.6 LBS

## 2022-01-05 DIAGNOSIS — I73.00 RAYNAUD'S PHENOMENON WITHOUT GANGRENE: ICD-10-CM

## 2022-01-05 DIAGNOSIS — M79.7 FIBROMYALGIA: ICD-10-CM

## 2022-01-05 DIAGNOSIS — M35.9 CONNECTIVE TISSUE DISEASE (HCC): Primary | ICD-10-CM

## 2022-01-05 DIAGNOSIS — Z51.81 MEDICATION MONITORING ENCOUNTER: ICD-10-CM

## 2022-01-05 PROCEDURE — G8420 CALC BMI NORM PARAMETERS: HCPCS | Performed by: INTERNAL MEDICINE

## 2022-01-05 PROCEDURE — G8427 DOCREV CUR MEDS BY ELIG CLIN: HCPCS | Performed by: INTERNAL MEDICINE

## 2022-01-05 PROCEDURE — G8482 FLU IMMUNIZE ORDER/ADMIN: HCPCS | Performed by: INTERNAL MEDICINE

## 2022-01-05 PROCEDURE — 3017F COLORECTAL CA SCREEN DOC REV: CPT | Performed by: INTERNAL MEDICINE

## 2022-01-05 PROCEDURE — 99214 OFFICE O/P EST MOD 30 MIN: CPT | Performed by: INTERNAL MEDICINE

## 2022-01-05 PROCEDURE — 1036F TOBACCO NON-USER: CPT | Performed by: INTERNAL MEDICINE

## 2022-01-05 RX ORDER — AMLODIPINE BESYLATE 5 MG/1
5 TABLET ORAL DAILY
Qty: 30 TABLET | Refills: 1 | Status: SHIPPED | OUTPATIENT
Start: 2022-01-05 | End: 2022-03-09 | Stop reason: SDUPTHER

## 2022-01-05 RX ORDER — FOLIC ACID 1 MG/1
2 TABLET ORAL DAILY
Qty: 60 TABLET | Refills: 3 | Status: SHIPPED | OUTPATIENT
Start: 2022-01-05 | End: 2022-08-26 | Stop reason: SDUPTHER

## 2022-01-05 ASSESSMENT — ENCOUNTER SYMPTOMS
EYE PAIN: 0
EYE REDNESS: 0
WHEEZING: 0
EYES NEGATIVE: 1
COUGH: 0
DIARRHEA: 0
VOMITING: 0
SHORTNESS OF BREATH: 0
NAUSEA: 0
CONSTIPATION: 0

## 2022-01-05 NOTE — PROGRESS NOTES
[facility] NOTE     Date Of Service: 1/5/2022  Provider: Jacobo Campos DO, DO  PCP: Mark Koyanagi   Name: Verona Cruz   MRN: [id number]    ASSESSMENT/PLAN:   1. Connective tissue disease ([address]  -     methotrexate (RHEUMATREX) 2.5 MG chemo tablet; Take 8 tablets by mouth once a week Take 4 tablets in the morning and 4 tablets in the evening once weekly, Disp-32 tablet, R-2Normal  2. Raynaud's phenomenon without gangrene  -     amLODIPine (NORVASC) 5 MG tablet; Take 1 tablet by mouth daily, Disp-30 tablet, R-1Normal  3. Fibromyalgia  4. Medication monitoring encounter    Undifferentiated connective tissue disease  - + BRANDON, strong positive SSA, polyarthralgia,  type hand changes, raynauds  - methotrexate 15 mg PO weekly +  folic acid 2 mg daily (increased 1/5/2021 from 1mg daily)  B/c mild low wbc on 12/2021 eval.      Raynauds phenomenon -  Cracking at finger tips. ? scabb right index finger   - + color changes of hands. + nail capillary changes. - increase noravsc to 5mg daily -      Fibromyalgia               - gabapentin 400 mg THREE TIME DAILY -- if pain continues discussed titration. Lateral epicondylitis- resolved     DeQuervains tenosynovitis - discussed wrist splinting     Medication monitoring               - CBC, CMP, sed rate, CRP q 12 weeks         Return in about 2 months (around 3/5/2022).   New Prescriptions    No medications on file     History of Present Illness (HPI)     Chief Complaint   Patient presents with    Follow-up     4 month         Verona Cruz  is a(n)56 y.o. female with a hx of HTN, HLD, cervicalgia, fibromyalgia, +BRANDON, osteoarthritis, impingement syndrome of left shoulder here for the f/u evaluation of + BRANDON, fibromyalgia      Ongoing pain in the bilateral hands ,elbows, knees,  toes,   Constant Pain described a hurt  Pain up to 5-6/10   Timing:mornings  Aggravating factors: increased use, activity, cold exposure  Alleviating factors: naproxen, gabapentin  AM stiffness lasting about 20 mins    Ongoing bubbling sensation in the legs     Color changes of hands and feet w/ numbness of feet intermittently occurring   Dry water eyes, denies crusting.   + dysphagia w solids >> liquids       REVIEW OF SYSTEMS: (ROS)    Review of Systems   Constitutional: Negative for diaphoresis, fatigue and fever. HENT: Negative for congestion, hearing loss and nosebleeds. Eyes: Negative. Negative for pain and redness. Respiratory: Negative for cough, shortness of breath and wheezing. Cardiovascular: Negative. Negative for chest pain. Gastrointestinal: Negative for constipation, diarrhea, nausea and vomiting. Genitourinary: Negative for difficulty urinating, frequency and hematuria. Musculoskeletal: Negative for myalgias. Skin: Negative for rash. Neurological: Negative for dizziness, weakness and headaches. Hematological: Does not bruise/bleed easily. Psychiatric/Behavioral: Negative for sleep disturbance. The patient is not nervous/anxious. PmHx:  has no past medical history on file. Social History:  reports that she has never smoked. She has never used smokeless tobacco. She reports that she does not drink alcohol and does not use drugs. ALLERGIES     Allergies   Allergen Reactions    Latex     Asa [Aspirin]      Restless itchy    Codeine      shakes       CURRENT MEDICATIONS      Current Outpatient Medications:     folic acid (FOLVITE) 1 MG tablet, Take 1 tablet by mouth daily, Disp: 30 tablet, Rfl: 3    methotrexate (RHEUMATREX) 2.5 MG chemo tablet, Take 8 tablets by mouth once a week Take 4 tablets in the morning and 4 tablets in the evening once weekly, Disp: 32 tablet, Rfl: 2    amLODIPine (NORVASC) 2.5 MG tablet, Take 1 tablet by mouth daily, Disp: 30 tablet, Rfl: 3    gabapentin (NEURONTIN) 400 MG capsule, Take 1 capsule by mouth 3 times daily for 180 days. , Disp: 90 capsule, Rfl: 5    venlafaxine (EFFEXOR XR) 75 MG extended release capsule, Take 150 mg by mouth daily , Disp: , Rfl:     estradiol (ESTRACE) 2 MG tablet, Take 2 mg by mouth daily, Disp: , Rfl:     bisoprolol (ZEBETA) 5 MG tablet, Take 5 mg by mouth daily, Disp: , Rfl:     Multiple Vitamins-Minerals (THERAPEUTIC MULTIVITAMIN-MINERALS) tablet, Take 1 tablet by mouth daily, Disp: , Rfl:     naproxen (NAPROSYN) 250 MG tablet, Take 250 mg by mouth 3 times daily as needed for Pain, Disp: , Rfl:     PHYSICAL EXAMINATION / OBJECTIVE     Objective:  BP (!) 142/78 (Site: Left Upper Arm, Position: Sitting, Cuff Size: Medium Adult)   Pulse 107   Ht 5' 4.25\" (1.632 m)   Wt 128 lb 9.6 oz (58.3 kg)   SpO2 98%   BMI 21.90 kg/m²     Physical Exam    General Appearance: General appearance:  AAO x 3 ,  well-developed and well nourished  Head: NCAT  Eyes: No abnormalities. ,  Sclera non-icteric,   Ears / Nose:  normal  appearance  ears and nose. No active drainage from nose. Mouth:  MMM, ears w/o deformities  Neck: No jugular venous distention, appears symmetric, good ROM  Lymph: no cervical adenopathy   Pulmonary/Chest: CTA bilateral ,  symmetric chest expansion. Cardiovascular: Normal S1 and S2, NO murmur, rub, gallop  : Deferred   Abd/GI: Deferred   Neurologic: Speech normal, no facial droop,  Skin: cyanosis / palor/redness of bilat hands and toes. Cracking at right 2,3 finger tip. ? Healed scab right index fingertip.      Upper extremities:    SHOULDERS tender bilat, no swelling ,   ELBOWS  - tender left   WRISTS - Non-tender   HANDS/FINGERS- finkelstein test bilateral      Lower extremities:  HIPS tender bilat outer hips  KNEES nontender, no swelling  ANKLES nontender, no swelling   FEET : + MTP compression bilat and midfoot tenderness          LABS      Lab Results   Component Value Date    WBC 4.0 10/29/2021    HGB 12.8 10/29/2021    MCV 96 10/29/2021    MCHC 33.2 10/29/2021    RDW 14.5 10/29/2021     10/29/2021 NEUTROABS 1.8 10/29/2021    LYMPHSABS 1.7 10/29/2021    EOSABS 0.2 10/29/2021    BASOSABS 0.0 10/29/2021         Chemistry        Component Value Date/Time     10/29/2021 1118    K 4.1 10/29/2021 1118     10/29/2021 1118    CO2 30 10/29/2021 1118    BUN 12 10/29/2021 1118    CREATININE 0.75 10/29/2021 1118        Component Value Date/Time    CALCIUM 8.8 10/29/2021 1118    ALKPHOS 62 10/29/2021 1118    ALKPHOS 60 12/21/2020 1149    AST 22 10/29/2021 1118    ALT 19 10/29/2021 1118    BILITOT 0.5 10/29/2021 1118            Lab Results   Component Value Date    SEDRATE 8 10/29/2021    SEDRATE 9 08/31/2021    SEDRATE 6 07/28/2021    CRP 0.3 10/29/2021    CRP 0.4 08/31/2021    CRP 0.5 07/28/2021         RADIOLOGY / PROCEDURES:                 Electronically signed by Cordelia Ling DO on 1/5/22 at 11:50 AM EST  Please contact the office if you have any questions or change of symptoms.

## 2022-01-24 ENCOUNTER — PATIENT MESSAGE (OUTPATIENT)
Dept: RHEUMATOLOGY | Age: 57
End: 2022-01-24

## 2022-01-25 RX ORDER — GABAPENTIN 600 MG/1
600 TABLET ORAL 3 TIMES DAILY
Qty: 90 TABLET | Refills: 2 | Status: SHIPPED | OUTPATIENT
Start: 2022-01-25 | End: 2022-03-09 | Stop reason: SDUPTHER

## 2022-01-26 ENCOUNTER — TELEPHONE (OUTPATIENT)
Dept: RHEUMATOLOGY | Age: 57
End: 2022-01-26

## 2022-01-26 NOTE — TELEPHONE ENCOUNTER
Patient said that the lab is telling her they havent received the orders for her yet. She is planning to have her daughter Casa Herrmann stop by the office tomorrow, 01/27/2022, to  a paper order for her.

## 2022-01-26 NOTE — TELEPHONE ENCOUNTER
[facility] stated they never got lab ordered called and confirmed number [phone] re faxed order and called pt and informed voiced understanding

## 2022-02-02 DIAGNOSIS — M35.9 CONNECTIVE TISSUE DISEASE (HCC): ICD-10-CM

## 2022-02-02 LAB
ABSOLUTE BASO #: 0 X10E9/L (ref 0–0.2)
ABSOLUTE EOS #: 0.2 X10E9/L (ref 0–0.4)
ABSOLUTE LYMPH #: 2 X10E9/L (ref 1–3.5)
ABSOLUTE MONO #: 0.7 X10E9/L (ref 0–0.9)
ABSOLUTE NEUT #: 2.2 X10E9/L (ref 1.5–6.6)
ALBUMIN SERPL-MCNC: 4.3 G/DL (ref 3.2–5.3)
ALK PHOSPHATASE: 62 U/L (ref 39–130)
ALT SERPL-CCNC: 16 U/L (ref 0–31)
ANION GAP SERPL CALCULATED.3IONS-SCNC: 7 MMOL/L (ref 5–15)
AST SERPL-CCNC: 17 U/L (ref 0–41)
BASOPHILS RELATIVE PERCENT: 0.7 %
BILIRUB SERPL-MCNC: 0.4 MG/DL (ref 0.3–1.2)
BUN BLDV-MCNC: 16 MG/DL (ref 5–23)
C-REACTIVE PROTEIN: 0.3 MG/DL (ref 0–0.74)
CALCIUM SERPL-MCNC: 8.7 MG/DL (ref 8.5–10.5)
CHLORIDE BLD-SCNC: 104 MMOL/L (ref 98–109)
CO2: 28 MMOL/L (ref 22–32)
CREAT SERPL-MCNC: 0.67 MG/DL (ref 0.4–1)
EGFR AFRICAN AMERICAN: >60 ML/MIN/1.73SQ.M
EGFR IF NONAFRICAN AMERICAN: >60 ML/MIN/1.73SQ.M
EOSINOPHILS RELATIVE PERCENT: 3 %
GLUCOSE: 88 MG/DL (ref 65–99)
HCT VFR BLD CALC: 37.5 % (ref 35–47)
HEMOGLOBIN: 12.4 G/DL (ref 11.7–15.5)
LYMPHOCYTE %: 38.9 %
MCH RBC QN AUTO: 31 PG (ref 27–34)
MCHC RBC AUTO-ENTMCNC: 33 G/DL (ref 32–36)
MCV RBC AUTO: 94 FL (ref 80–100)
MONOCYTES # BLD: 13.9 %
NEUTROPHILS RELATIVE PERCENT: 43.5 %
PDW BLD-RTO: 14.6 % (ref 11.5–15)
PLATELETS: 234 X10E9/L (ref 150–450)
PMV BLD AUTO: 10.4 FL (ref 7–12)
POTASSIUM SERPL-SCNC: 4.1 MMOL/L (ref 3.5–5)
RBC: 4 X10E12/L (ref 3.8–5.2)
SEDIMENTATION RATE, ERYTHROCYTE: 13 MM/H (ref 0–30)
SODIUM BLD-SCNC: 139 MMOL/L (ref 134–146)
TOTAL PROTEIN: 6.9 G/DL (ref 6–8)
WBC: 5 X10E9/L (ref 4–11)

## 2022-03-09 ENCOUNTER — OFFICE VISIT (OUTPATIENT)
Dept: RHEUMATOLOGY | Age: 57
End: 2022-03-09
Payer: COMMERCIAL

## 2022-03-09 VITALS
HEART RATE: 93 BPM | OXYGEN SATURATION: 94 % | SYSTOLIC BLOOD PRESSURE: 124 MMHG | WEIGHT: 131.2 LBS | BODY MASS INDEX: 22.4 KG/M2 | HEIGHT: 64 IN | DIASTOLIC BLOOD PRESSURE: 72 MMHG

## 2022-03-09 DIAGNOSIS — Z51.81 MEDICATION MONITORING ENCOUNTER: ICD-10-CM

## 2022-03-09 DIAGNOSIS — M65.4 DE QUERVAIN'S TENOSYNOVITIS: ICD-10-CM

## 2022-03-09 DIAGNOSIS — M35.9 CONNECTIVE TISSUE DISEASE (HCC): Primary | ICD-10-CM

## 2022-03-09 DIAGNOSIS — I73.00 RAYNAUD'S PHENOMENON WITHOUT GANGRENE: ICD-10-CM

## 2022-03-09 DIAGNOSIS — M79.7 FIBROMYALGIA: ICD-10-CM

## 2022-03-09 PROCEDURE — 1036F TOBACCO NON-USER: CPT | Performed by: NURSE PRACTITIONER

## 2022-03-09 PROCEDURE — G8427 DOCREV CUR MEDS BY ELIG CLIN: HCPCS | Performed by: NURSE PRACTITIONER

## 2022-03-09 PROCEDURE — G8482 FLU IMMUNIZE ORDER/ADMIN: HCPCS | Performed by: NURSE PRACTITIONER

## 2022-03-09 PROCEDURE — 99214 OFFICE O/P EST MOD 30 MIN: CPT | Performed by: NURSE PRACTITIONER

## 2022-03-09 PROCEDURE — 3017F COLORECTAL CA SCREEN DOC REV: CPT | Performed by: NURSE PRACTITIONER

## 2022-03-09 PROCEDURE — G8420 CALC BMI NORM PARAMETERS: HCPCS | Performed by: NURSE PRACTITIONER

## 2022-03-09 RX ORDER — AMLODIPINE BESYLATE 2.5 MG/1
2.5 TABLET ORAL DAILY
Qty: 30 TABLET | Refills: 2 | Status: SHIPPED | OUTPATIENT
Start: 2022-03-09 | End: 2022-07-13 | Stop reason: ALTCHOICE

## 2022-03-09 RX ORDER — GABAPENTIN 600 MG/1
600 TABLET ORAL 3 TIMES DAILY
Qty: 90 TABLET | Refills: 2 | Status: SHIPPED | OUTPATIENT
Start: 2022-03-09 | End: 2022-08-03 | Stop reason: SDUPTHER

## 2022-03-09 ASSESSMENT — ENCOUNTER SYMPTOMS
NAUSEA: 0
SHORTNESS OF BREATH: 0
BACK PAIN: 0
EYE PAIN: 0
COUGH: 1
CONSTIPATION: 0
TROUBLE SWALLOWING: 0
DIARRHEA: 0
ABDOMINAL PAIN: 0
EYE ITCHING: 0

## 2022-03-09 NOTE — PROGRESS NOTES
[place] [facility]HEUMATOLOGY FOLLOW UP NOTE       Date Of Service: 3/9/2022  Provider: JOVAN Miguel - TAZ    Name: Phuong Manzo   MRN: [id number]    Chief Complaint(s)     Chief Complaint   Patient presents with    Follow-up     2 month         History of Present Illness (HPI)     Phuong Manzo  is a(n)56 y.o. female with a hx of HTN, HLD, cervicalgia, fibromyalgia, +BRANDON, osteoarthritis, impingement syndrome of left shoulder here for the f/u evaluation of undifferentiated connective tissue disease    Interval hx:    - increased gabapentin to 600 mg in the morning and afternoon and 400 mg qhs- helping with fibromyalgia symptoms   - + night sweats since increasing norvasc dose to 5 mg daily    pain affecting the fingers, wrists, elbows, shoulders, hips, knees, ankles, toes, neck, back  Pain on a scale 0-10: 5.5/10  Type of pain: constant  Timing:mornings  Aggravating factors: increased use, activity, cold exposure  Alleviating factors: naproxen, gabapentin    Associated symptoms:  denies swelling/  Redness/ warmth, + AM stiffness lasting about 15 mins      REVIEW OF SYSTEMS: (ROS)    Review of Systems   Constitutional: Positive for fatigue. Negative for fever and unexpected weight change. HENT: Positive for tinnitus. Negative for congestion and trouble swallowing. Dry mouth   Eyes: Negative for pain and itching. Dry eyes   Respiratory: Positive for cough. Negative for shortness of breath. Cardiovascular: Negative for chest pain and leg swelling. Gastrointestinal: Negative for abdominal pain, constipation, diarrhea and nausea. Endocrine: Negative for cold intolerance and heat intolerance. Genitourinary: Negative for difficulty urinating, frequency and urgency. Musculoskeletal: Positive for arthralgias, myalgias and neck pain. Negative for back pain and joint swelling. Skin: Negative for rash. Neurological: Positive for weakness and headaches.  Negative for dizziness and numbness. Psychiatric/Behavioral: The patient is nervous/anxious. PAST MEDICAL HISTORY    History reviewed. No pertinent past medical history. PAST SURGICAL HISTORY      Past Surgical History:   Procedure Laterality Date    HYSTERECTOMY, TOTAL ABDOMINAL  2006    bleeding, endomedtriosis       FAMILY HISTORY      Family History   Problem Relation Age of Onset    Heart Disease Father        SOCIAL HISTORY      Social History     Tobacco History     Smoking Status  Never Smoker    Smokeless Tobacco Use  Never Used          Alcohol History     Alcohol Use Status  Never          Drug Use     Drug Use Status  Never          Sexual Activity     Sexually Active  Not Asked                ALLERGIES     Allergies   Allergen Reactions    Latex     Asa [Aspirin]      Restless itchy    Codeine      shakes       CURRENT MEDICATIONS      Current Outpatient Medications   Medication Sig Dispense Refill    gabapentin (NEURONTIN) 600 MG tablet Take 1 tablet by mouth 3 times daily for 90 days. 90 tablet 2    amLODIPine (NORVASC) 5 MG tablet Take 1 tablet by mouth daily 30 tablet 1    folic acid (FOLVITE) 1 MG tablet Take 2 tablets by mouth daily 60 tablet 3    methotrexate (RHEUMATREX) 2.5 MG chemo tablet Take 8 tablets by mouth once a week Take 4 tablets in the morning and 4 tablets in the evening once weekly 32 tablet 2    venlafaxine (EFFEXOR XR) 75 MG extended release capsule Take 150 mg by mouth daily       estradiol (ESTRACE) 2 MG tablet Take 2 mg by mouth daily      bisoprolol (ZEBETA) 5 MG tablet Take 5 mg by mouth daily (Patient not taking: Reported on 1/5/2022)      Multiple Vitamins-Minerals (THERAPEUTIC MULTIVITAMIN-MINERALS) tablet Take 1 tablet by mouth daily (Patient not taking: Reported on 1/5/2022)      naproxen (NAPROSYN) 250 MG tablet Take 250 mg by mouth 3 times daily as needed for Pain       No current facility-administered medications for this visit.        PHYSICAL EXAMINATION / OBJECTIVE   Objective:  /72 (Site: Left Upper Arm, Position: Sitting, Cuff Size: Medium Adult)   Ht 5' 4.25\" (1.632 m)   Wt 131 lb 3.2 oz (59.5 kg)   BMI 22.34 kg/m²     Physical Exam  Vitals reviewed. Constitutional:       Appearance: She is well-developed. Cardiovascular:      Rate and Rhythm: Normal rate and regular rhythm. Pulmonary:      Effort: Pulmonary effort is normal.      Breath sounds: Normal breath sounds. Musculoskeletal:      Cervical back: Normal range of motion and neck supple. Skin:     General: Skin is warm and dry. Findings: No rash. Neurological:      Mental Status: She is alert and oriented to person, place, and time. Psychiatric:         Thought Content:  Thought content normal.       Upper extremities:    SHOULDERS tender bilat, no swelling ,   ELBOWS nontender, no swelling  WRISTS tender bilat, no swelling  HANDS/FINGERS   Tender right MCPs and PIPs, no swelling    + finkelstein testing bilat    Lower extremities:  HIPS tender bilat outer hips  KNEES nontender, no swelling  ANKLES nontender, no swelling   FEET : + MTP compression bilat and midfoot tenderness      RAPID 3:   3/9/2022 --- RAPID 3: 1 + 6 + 5.5 = 12.5     Remission: <3  Low Disease Activity: <6  Moderate Disease Activity: >=6 and <=12  High Disease Activity: >12     LABS    CBC  Lab Results   Component Value Date    WBC 5.0 02/01/2022    WBC 4.5 12/21/2020    RBC 4.00 02/01/2022    HGB 12.4 02/01/2022    HCT 37.5 02/01/2022    MCV 94 02/01/2022    MCH 31.0 02/01/2022    MCHC 33.0 02/01/2022    RDW 14.6 02/01/2022     02/01/2022     12/21/2020       CMP  Lab Results   Component Value Date    CALCIUM 8.7 02/01/2022    LABALBU 4.3 02/01/2022    PROT 6.9 02/01/2022     02/01/2022    K 4.1 02/01/2022    CO2 28 02/01/2022     02/01/2022    BUN 16 02/01/2022    CREATININE 0.67 02/01/2022    ALKPHOS 62 02/01/2022    ALKPHOS 60 12/21/2020    ALT 16 02/01/2022    AST 17 02/01/2022       HgBA1c: No components found for: HGBA1C    No results found for: VITD25      No results found for: ANASCRN  No results found for: SSA  No results found for: SSB  No results found for: ANTI-SMITH  No results found for: DSDNAAB   No results found for: ANTIRNP  No results found for: C3, C4  No results found for: CCPAB  No results found for: RF    No components found for: CANCASCRN, APANCASCRN  Lab Results   Component Value Date    SEDRATE 13 02/01/2022     Lab Results   Component Value Date    CRP 0.3 02/01/2022       RADIOLOGY:         ASSESSMENT/PLAN    Assessment   Plan     Undifferentiated connective tissue disease  - + BRANDON, strong positive SSA, polyarthralgia,  type hand changes, raynauds   - methotrexate 15 mg PO weekly & folic acid 2 mg daily    Raynauds phenomenon  - + color changes of hands. + nail capillary changes. - decrease norvasc to 2.5 mg daily to see if night sweats improve    Fibromyalgia   - increase gabapentin to 600 mg TID    Lateral epicondylitis- resolved    DeQuervains tenosynovitis   - discussed wrist splinting    Medication monitoring   - CBC, CMP, sed rate, CRP q 12 weeks      No follow-ups on file. Electronically signed by JOVAN Francis CNP on 3/9/2022 at 2:42 PM    New Prescriptions    No medications on file       Thank you for allowing me to participate in the care of this patient. Please call if there are any questions.

## 2022-04-13 DIAGNOSIS — M35.9 CONNECTIVE TISSUE DISEASE (HCC): ICD-10-CM

## 2022-04-13 LAB
ABSOLUTE BASO #: 0 X10E9/L (ref 0–0.2)
ABSOLUTE EOS #: 0.2 X10E9/L (ref 0–0.4)
ABSOLUTE LYMPH #: 1.5 X10E9/L (ref 1–3.5)
ABSOLUTE MONO #: 0.7 X10E9/L (ref 0–0.9)
ABSOLUTE NEUT #: 2 X10E9/L (ref 1.5–6.6)
ALBUMIN SERPL-MCNC: 4.3 G/DL (ref 3.2–5.3)
ALK PHOSPHATASE: 67 U/L (ref 39–130)
ALT SERPL-CCNC: 16 U/L (ref 0–31)
ANION GAP SERPL CALCULATED.3IONS-SCNC: 8 MMOL/L (ref 5–15)
AST SERPL-CCNC: 23 U/L (ref 0–41)
BASOPHILS RELATIVE PERCENT: 0.7 %
BILIRUB SERPL-MCNC: 0.4 MG/DL (ref 0.3–1.2)
BUN BLDV-MCNC: 9 MG/DL (ref 5–23)
C-REACTIVE PROTEIN: 0.3 MG/DL (ref 0–0.74)
CALCIUM SERPL-MCNC: 8.5 MG/DL (ref 8.5–10.5)
CHLORIDE BLD-SCNC: 105 MMOL/L (ref 98–109)
CO2: 29 MMOL/L (ref 22–32)
CREAT SERPL-MCNC: 0.76 MG/DL (ref 0.4–1)
EGFR AFRICAN AMERICAN: >60 ML/MIN/1.73SQ.M
EGFR IF NONAFRICAN AMERICAN: >60 ML/MIN/1.73SQ.M
EOSINOPHILS RELATIVE PERCENT: 3.5 %
GLUCOSE: 68 MG/DL (ref 65–99)
HCT VFR BLD CALC: 37.6 % (ref 35–47)
HEMOGLOBIN: 12.7 G/DL (ref 11.7–15.5)
LYMPHOCYTE %: 34.4 %
MCH RBC QN AUTO: 31.8 PG (ref 27–34)
MCHC RBC AUTO-ENTMCNC: 33.6 G/DL (ref 32–36)
MCV RBC AUTO: 95 FL (ref 80–100)
MONOCYTES # BLD: 15.6 %
NEUTROPHILS RELATIVE PERCENT: 45.8 %
PDW BLD-RTO: 15.6 % (ref 11.5–15)
PLATELETS: 257 X10E9/L (ref 150–450)
PMV BLD AUTO: 10.3 FL (ref 7–12)
POTASSIUM SERPL-SCNC: 4 MMOL/L (ref 3.5–5)
RBC: 3.98 X10E12/L (ref 3.8–5.2)
SEDIMENTATION RATE, ERYTHROCYTE: 11 MM/H (ref 0–30)
SODIUM BLD-SCNC: 142 MMOL/L (ref 134–146)
TOTAL PROTEIN: 7.2 G/DL (ref 6–8)
WBC: 4.4 X10E9/L (ref 4–11)

## 2022-05-11 ENCOUNTER — OFFICE VISIT (OUTPATIENT)
Dept: RHEUMATOLOGY | Age: 57
End: 2022-05-11
Payer: COMMERCIAL

## 2022-05-11 VITALS
WEIGHT: 129.4 LBS | OXYGEN SATURATION: 96 % | HEART RATE: 87 BPM | DIASTOLIC BLOOD PRESSURE: 84 MMHG | HEIGHT: 64 IN | SYSTOLIC BLOOD PRESSURE: 140 MMHG | BODY MASS INDEX: 22.09 KG/M2

## 2022-05-11 DIAGNOSIS — M65.4 DE QUERVAIN'S TENOSYNOVITIS: ICD-10-CM

## 2022-05-11 DIAGNOSIS — I73.00 RAYNAUD'S PHENOMENON WITHOUT GANGRENE: ICD-10-CM

## 2022-05-11 DIAGNOSIS — Z51.81 MEDICATION MONITORING ENCOUNTER: ICD-10-CM

## 2022-05-11 DIAGNOSIS — M35.9 CONNECTIVE TISSUE DISEASE (HCC): Primary | ICD-10-CM

## 2022-05-11 DIAGNOSIS — M79.7 FIBROMYALGIA: ICD-10-CM

## 2022-05-11 PROCEDURE — 99214 OFFICE O/P EST MOD 30 MIN: CPT | Performed by: NURSE PRACTITIONER

## 2022-05-11 PROCEDURE — G8427 DOCREV CUR MEDS BY ELIG CLIN: HCPCS | Performed by: NURSE PRACTITIONER

## 2022-05-11 PROCEDURE — 1036F TOBACCO NON-USER: CPT | Performed by: NURSE PRACTITIONER

## 2022-05-11 PROCEDURE — 3017F COLORECTAL CA SCREEN DOC REV: CPT | Performed by: NURSE PRACTITIONER

## 2022-05-11 PROCEDURE — G8420 CALC BMI NORM PARAMETERS: HCPCS | Performed by: NURSE PRACTITIONER

## 2022-05-11 RX ORDER — PREDNISONE 10 MG/1
TABLET ORAL
Qty: 15 TABLET | Refills: 0 | Status: SHIPPED | OUTPATIENT
Start: 2022-05-11 | End: 2022-05-21

## 2022-05-11 RX ORDER — NIFEDIPINE 30 MG/1
30 TABLET, EXTENDED RELEASE ORAL DAILY
Qty: 30 TABLET | Refills: 0 | Status: SHIPPED | OUTPATIENT
Start: 2022-05-11 | End: 2022-06-07 | Stop reason: SDUPTHER

## 2022-05-11 ASSESSMENT — ENCOUNTER SYMPTOMS
CONSTIPATION: 0
TROUBLE SWALLOWING: 0
COUGH: 0
EYE PAIN: 0
DIARRHEA: 0
NAUSEA: 0
SHORTNESS OF BREATH: 0
BACK PAIN: 0
EYE ITCHING: 0
ABDOMINAL PAIN: 0

## 2022-05-11 NOTE — PROGRESS NOTES
[facility] RHEUMATOLOGY FOLLOW UP NOTE       Date Of Service: 5/11/2022  Provider: JOVAN Lang - TAZ    Name: Yolanda Chavez   MRN: [id number]    Chief Complaint(s)     Chief Complaint   Patient presents with    Follow-up     2 month         History of Present Illness (HPI)     Yolanda Chavez  is a(n)56 y.o. female with a hx of HTN, HLD, cervicalgia, fibromyalgia, +BRANDON, osteoarthritis, impingement syndrome of left shoulder here for the f/u evaluation of undifferentiated connective tissue disease    Interval hx:    - no new issues    pain affecting the fingers, wrists, elbows, shoulders, hips, knees, ankles, toes, neck, back  Pain on a scale 0-10: 7/10  Type of pain: intermittent  Timing: afternoon and evening  Aggravating factors: increased use, activity, cold exposure  Alleviating factors: naproxen, gabapentin    Associated symptoms:  + swelling/  Redness/ warmth (hands), denies AM stiffness       REVIEW OF SYSTEMS: (ROS)    Review of Systems   Constitutional: Positive for fatigue. Negative for fever and unexpected weight change. HENT: Positive for tinnitus. Negative for congestion and trouble swallowing. Dry mouth   Eyes: Negative for pain and itching. Dry eyes   Respiratory: Negative for cough and shortness of breath. Cardiovascular: Negative for chest pain and leg swelling. Gastrointestinal: Negative for abdominal pain, constipation, diarrhea and nausea. Endocrine: Negative for cold intolerance and heat intolerance. Genitourinary: Negative for difficulty urinating, frequency and urgency. Musculoskeletal: Positive for arthralgias, joint swelling, myalgias and neck pain. Negative for back pain. Skin: Negative for rash. Neurological: Positive for weakness and headaches. Negative for dizziness and numbness. Psychiatric/Behavioral: The patient is nervous/anxious. PAST MEDICAL HISTORY    History reviewed. No pertinent past medical history.     PAST SURGICAL HISTORY      Past Surgical History:   Procedure Laterality Date    HYSTERECTOMY, TOTAL ABDOMINAL  2006    bleeding, endomedtriosis       FAMILY HISTORY      Family History   Problem Relation Age of Onset    Heart Disease Father        SOCIAL HISTORY      Social History     Tobacco History     Smoking Status  Never Smoker    Smokeless Tobacco Use  Never Used          Alcohol History     Alcohol Use Status  Never          Drug Use     Drug Use Status  Never          Sexual Activity     Sexually Active  Not Asked                ALLERGIES     Allergies   Allergen Reactions    Latex     Asa [Aspirin]      Restless itchy    Codeine      shakes       CURRENT MEDICATIONS      Current Outpatient Medications   Medication Sig Dispense Refill    methotrexate (RHEUMATREX) 2.5 MG chemo tablet Take 8 tablets by mouth once a week Take 4 tablets in the morning and 4 tablets in the evening once weekly 32 tablet 2    gabapentin (NEURONTIN) 600 MG tablet Take 1 tablet by mouth 3 times daily for 90 days. 90 tablet 2    amLODIPine (NORVASC) 2.5 MG tablet Take 1 tablet by mouth daily 30 tablet 2    folic acid (FOLVITE) 1 MG tablet Take 2 tablets by mouth daily 60 tablet 3    venlafaxine (EFFEXOR XR) 75 MG extended release capsule Take 150 mg by mouth daily       estradiol (ESTRACE) 2 MG tablet Take 2 mg by mouth daily      bisoprolol (ZEBETA) 5 MG tablet Take 5 mg by mouth daily       Multiple Vitamins-Minerals (THERAPEUTIC MULTIVITAMIN-MINERALS) tablet Take 1 tablet by mouth daily       naproxen (NAPROSYN) 250 MG tablet Take 250 mg by mouth 3 times daily as needed for Pain       No current facility-administered medications for this visit. PHYSICAL EXAMINATION / OBJECTIVE   Objective:  BP (!) 140/84 (Site: Left Upper Arm, Position: Sitting, Cuff Size: Medium Adult)   Pulse 87   Ht 5' 4.02\" (1.626 m)   Wt 129 lb 6.4 oz (58.7 kg)   SpO2 96%   BMI 22.20 kg/m²     Physical Exam  Vitals reviewed. Constitutional:       Appearance: She is well-developed. Cardiovascular:      Rate and Rhythm: Normal rate and regular rhythm. Pulmonary:      Effort: Pulmonary effort is normal.      Breath sounds: Normal breath sounds. Musculoskeletal:      Cervical back: Normal range of motion and neck supple. Skin:     General: Skin is warm and dry. Findings: No rash. Neurological:      Mental Status: She is alert and oriented to person, place, and time. Psychiatric:         Thought Content:  Thought content normal.       Upper extremities:    SHOULDERS tender bilat, no swelling ,   ELBOWS nontender, no swelling  WRISTS tender bilat, no swelling  HANDS/FINGERS   Tender bilat MCPS and PIPs, no swelling   + finkelstein testing bilat    Lower extremities:  HIPS tender bilat outer hips  KNEES nontender, no swelling  ANKLES nontender, no swelling   FEET : + MTP compression bilat and midfoot tenderness bilat    RAPID 3:   5/11/2022 --- RAPID 3: 1 + 6 + 5.5 = 12.5     Remission: <3  Low Disease Activity: <6  Moderate Disease Activity: >=6 and <=12  High Disease Activity: >12     LABS    CBC  Lab Results   Component Value Date    WBC 4.4 04/12/2022    WBC 4.5 12/21/2020    RBC 3.98 04/12/2022    HGB 12.7 04/12/2022    HCT 37.6 04/12/2022    MCV 95 04/12/2022    MCH 31.8 04/12/2022    MCHC 33.6 04/12/2022    RDW 15.6 04/12/2022     04/12/2022     12/21/2020       CMP  Lab Results   Component Value Date    CALCIUM 8.5 04/12/2022    LABALBU 4.3 04/12/2022    PROT 7.2 04/12/2022     04/12/2022    K 4.0 04/12/2022    CO2 29 04/12/2022     04/12/2022    BUN 9 04/12/2022    CREATININE 0.76 04/12/2022    ALKPHOS 67 04/12/2022    ALKPHOS 60 12/21/2020    ALT 16 04/12/2022    AST 23 04/12/2022       HgBA1c: No components found for: HGBA1C    No results found for: VITD25      No results found for: ANASCRN  No results found for: SSA  No results found for: SSB  No results found for: ANTI-SMITH  No results found for: DSDNAAB   No results found for: ANTIRNP  No results found for: C3, C4  No results found for: CCPAB  No results found for: RF    No components found for: CANCASCRN, APANCASCRN  Lab Results   Component Value Date    SEDRATE 11 04/12/2022     Lab Results   Component Value Date    CRP 0.3 04/12/2022       RADIOLOGY:         ASSESSMENT/PLAN    Assessment   Plan     Undifferentiated connective tissue disease  - + BRANDON, strong positive SSA, polyarthralgia,  type hand changes, raynauds   - methotrexate 20 mg PO weekly & folic acid 2 mg daily    Raynauds phenomenon  - + color changes of hands. + nail capillary changes. - prior tx: norvasc 5 mg daily (night sweats)   - stop norvasc 2.5 mg daily due to night sweats   - start procardia 30 mg daily    Fibromyalgia- active   - gabapentin 600 mg TID   - discussed importance of exercising    Lateral epicondylitis- active   - discussed bracing    DeQuervains tenosynovitis   - wrist splinting    Medication monitoring   - CBC, CMP, sed rate, CRP q 12 weeks      No follow-ups on file. Electronically signed by JOVAN Cotton CNP on 5/11/2022 at 2:53 PM    New Prescriptions    No medications on file       Thank you for allowing me to participate in the care of this patient. Please call if there are any questions. 0

## 2022-06-15 DIAGNOSIS — M35.9 CONNECTIVE TISSUE DISEASE (HCC): ICD-10-CM

## 2022-06-15 LAB
ABSOLUTE BASO #: 0.03 K/UL (ref 0–0.2)
ABSOLUTE EOS #: 0.22 K/UL (ref 0–0.5)
ABSOLUTE LYMPH #: 1.86 K/UL (ref 1–4)
ABSOLUTE MONO #: 0.76 K/UL (ref 0.2–1)
ABSOLUTE NEUT #: 2.75 K/UL (ref 1.5–7.5)
ALBUMIN SERPL-MCNC: 4.7 G/DL (ref 3.5–5.2)
ALK PHOSPHATASE: 93 U/L (ref 40–136)
ALT SERPL-CCNC: 21 U/L (ref 5–40)
ANION GAP SERPL CALCULATED.3IONS-SCNC: 9 MEQ/L (ref 10–19)
AST SERPL-CCNC: 6 U/L (ref 9–40)
BASOPHILS RELATIVE PERCENT: 0.5 %
BILIRUB SERPL-MCNC: 0.1 MG/DL
BUN BLDV-MCNC: 8 MG/DL (ref 6–20)
C-REACTIVE PROTEIN: <3 G/DL
CALCIUM SERPL-MCNC: 9.1 MG/DL (ref 8.5–10.5)
CHLORIDE BLD-SCNC: 102 MEQ/L (ref 95–107)
CO2: 29 MEQ/L (ref 19–31)
CREAT SERPL-MCNC: 0.72 MG/DL (ref 0.6–1.3)
EGFR IF NONAFRICAN AMERICAN: 94 ML/MIN/1.73
EOSINOPHILS RELATIVE PERCENT: 3.9 %
GLUCOSE: 71 MG/DL (ref 70–99)
HCT VFR BLD CALC: 39.3 % (ref 34–45)
HEMOGLOBIN: 12.7 G/DL (ref 11.5–15.5)
LYMPHOCYTE %: 32.9 %
MCH RBC QN AUTO: 30.4 PG (ref 25–33)
MCHC RBC AUTO-ENTMCNC: 32.3 G/DL (ref 31–36)
MCV RBC AUTO: 94 FL (ref 80–99)
MONOCYTES # BLD: 13.4 %
NEUTROPHILS RELATIVE PERCENT: 48.6 %
PDW BLD-RTO: 14.2 % (ref 11.5–15)
PLATELETS: 251 K/UL (ref 130–400)
PMV BLD AUTO: 12.3 FL (ref 9.3–13)
POTASSIUM SERPL-SCNC: 4.8 MEQ/L (ref 3.5–5.4)
RBC: 4.18 M/UL (ref 3.8–5.4)
SEDIMENTATION RATE, ERYTHROCYTE: 15 MM/HR (ref 0–20)
SODIUM BLD-SCNC: 140 MEQ/L (ref 133–146)
TOTAL PROTEIN: 6.8 G/DL (ref 6.1–8.3)
WBC: 5.7 K/UL (ref 3.5–11)

## 2022-06-15 NOTE — PROGRESS NOTES
1. Connective tissue disease ([facility] 75.)  - methotrexate (RHEUMATREX) 2.5 MG chemo tablet; Take 8 tablets by mouth once a week Take 4 tablets in the morning and 4 tablets in the evening once weekly  Dispense: 32 tablet; Refill: 2    2. Medication monitoring - repeat labs q 12 weeks.

## 2022-07-13 ENCOUNTER — OFFICE VISIT (OUTPATIENT)
Dept: RHEUMATOLOGY | Age: 57
End: 2022-07-13
Payer: COMMERCIAL

## 2022-07-13 VITALS
HEIGHT: 64 IN | WEIGHT: 133.6 LBS | DIASTOLIC BLOOD PRESSURE: 78 MMHG | SYSTOLIC BLOOD PRESSURE: 136 MMHG | BODY MASS INDEX: 22.81 KG/M2 | OXYGEN SATURATION: 100 % | HEART RATE: 68 BPM

## 2022-07-13 DIAGNOSIS — I73.00 RAYNAUD'S PHENOMENON WITHOUT GANGRENE: ICD-10-CM

## 2022-07-13 DIAGNOSIS — M25.511 CHRONIC PAIN OF BOTH SHOULDERS: ICD-10-CM

## 2022-07-13 DIAGNOSIS — Z51.81 MEDICATION MONITORING ENCOUNTER: ICD-10-CM

## 2022-07-13 DIAGNOSIS — G89.29 CHRONIC PAIN OF BOTH SHOULDERS: ICD-10-CM

## 2022-07-13 DIAGNOSIS — M79.7 FIBROMYALGIA: ICD-10-CM

## 2022-07-13 DIAGNOSIS — M25.512 CHRONIC PAIN OF BOTH SHOULDERS: ICD-10-CM

## 2022-07-13 DIAGNOSIS — M35.9 CONNECTIVE TISSUE DISEASE (HCC): Primary | ICD-10-CM

## 2022-07-13 PROCEDURE — G8420 CALC BMI NORM PARAMETERS: HCPCS | Performed by: INTERNAL MEDICINE

## 2022-07-13 PROCEDURE — G8427 DOCREV CUR MEDS BY ELIG CLIN: HCPCS | Performed by: INTERNAL MEDICINE

## 2022-07-13 PROCEDURE — 1036F TOBACCO NON-USER: CPT | Performed by: INTERNAL MEDICINE

## 2022-07-13 PROCEDURE — 3017F COLORECTAL CA SCREEN DOC REV: CPT | Performed by: INTERNAL MEDICINE

## 2022-07-13 PROCEDURE — 99214 OFFICE O/P EST MOD 30 MIN: CPT | Performed by: INTERNAL MEDICINE

## 2022-07-13 ASSESSMENT — ENCOUNTER SYMPTOMS
SHORTNESS OF BREATH: 0
NAUSEA: 0
COUGH: 0
EYE ITCHING: 0
DIARRHEA: 0
CONSTIPATION: 0
TROUBLE SWALLOWING: 0
BACK PAIN: 0
ABDOMINAL PAIN: 0
EYE PAIN: 0

## 2022-07-13 NOTE — PATIENT INSTRUCTIONS
Patient Education        Rotator Cuff: Exercises  Introduction  Here are some examples of exercises for you to try. The exercises may be suggested for a condition or for rehabilitation. Start each exercise slowly. Ease off the exercises if you start to have pain. You will be told when to start these exercises and which ones will work bestfor you. How to do the exercises  Pendulum swing    If you have pain in your back, do not do this exercise. 1. Hold on to a table or the back of a chair with your good arm. Then bend forward a little and let your sore arm hang straight down. This exercise does not use the arm muscles. Rather, use your legs and your hips to create movement that makes your arm swing freely. 2. Use the movement from your hips and legs to guide the slightly swinging arm back and forth like a pendulum (or elephant trunk). Then guide it in circles that start small (about the size of a dinner plate). Make the circles a bit larger each day, as your pain allows. 3. Do this exercise for 5 minutes, 5 to 7 times each day. 4. As you have less pain, try bending over a little farther to do this exercise. This will increase the amount of movement at your shoulder. Posterior stretching exercise    1. Hold the elbow of your injured arm with your other hand. 2. Use your hand to pull your injured arm gently up and across your body. You will feel a gentle stretch across the back of your injured shoulder. 3. Hold for at least 15 to 30 seconds. Then slowly lower your arm. 4. Repeat 2 to 4 times. Up-the-back stretch    Your doctor or physical therapist may want you to wait to do this stretch until you have regained most of your range of motion and strength. You can do this stretch in different ways. Hold any of these stretches for at least 15 to 30seconds. Repeat them 2 to 4 times. 1. Light stretch: Put your hand in your back pocket. Let it rest there to stretch your shoulder. 2. Moderate stretch:  With your other hand, hold your injured arm (palm outward) behind your back by the wrist. Pull your arm up gently to stretch your shoulder. 3. Advanced stretch: Put a towel over your other shoulder. Put the hand of your injured arm behind your back. Now hold the back end of the towel. With the other hand, hold the front end of the towel in front of your body. Pull gently on the front end of the towel. This will bring your hand farther up your back to stretch your shoulder. Overhead stretch    1. Standing about an arm's length away, grasp onto a solid surface. You could use a countertop, a doorknob, or the back of a sturdy chair. 2. With your knees slightly bent, bend forward with your arms straight. Lower your upper body, and let your shoulders stretch. 3. As your shoulders are able to stretch farther, you may need to take a step or two backward. 4. Hold for at least 15 to 30 seconds. Then stand up and relax. If you had stepped back during your stretch, step forward so you can keep your hands on the solid surface. 5. Repeat 2 to 4 times. Shoulder flexion (lying down)    To make a wand for this exercise, use a piece of PVC pipe or a broom handlewith the broom removed. Make the wand about a foot wider than your shoulders. 1. Lie on your back, holding a wand with both hands. Your palms should face down as you hold the wand. 2. Keeping your elbows straight, slowly raise your arms over your head. Raise them until you feel a stretch in your shoulders, upper back, and chest.  3. Hold for 15 to 30 seconds. 4. Repeat 2 to 4 times. Shoulder rotation (lying down)    To make a wand for this exercise, use a piece of PVC pipe or a broom handlewith the broom removed. Make the wand about a foot wider than your shoulders. 1. Lie on your back. Hold a wand with both hands with your elbows bent and palms up. 2. Keep your elbows close to your body, and move the wand across your body toward the sore arm.   3. Hold for 8 to 12 seconds. 4. Repeat 2 to 4 times. Wall climbing (to the side)    Avoid any movement that is straight to your side, and be careful not to archyour back. Your arm should stay about 30 degrees to the front of your side. 1. Stand with your side to a wall so that your fingers can just touch it at an angle about 30 degrees toward the front of your body. 2. Walk the fingers of your injured arm up the wall as high as pain permits. Try not to shrug your shoulder up toward your ear as you move your arm up. 3. Hold that position for a count of at least 15 to 20.  4. Walk your fingers back down to the starting position. 5. Repeat at least 2 to 4 times. Try to reach higher each time. Wall climbing (to the front)    During this stretching exercise, be careful not to arch your back. 1. Face a wall, and stand so your fingers can just touch it. 2. Keeping your shoulder down, walk the fingers of your injured arm up the wall as high as pain permits. (Don't shrug your shoulder up toward your ear.)  3. Hold your arm in that position for at least 15 to 30 seconds. 4. Slowly walk your fingers back down to where you started. 5. Repeat at least 2 to 4 times. Try to reach higher each time. Shoulder blade squeeze    1. Stand with your arms at your sides, and squeeze your shoulder blades together. Do not raise your shoulders up as you squeeze. 2. Hold 6 seconds. 3. Repeat 8 to 12 times. Scapular exercise: Arm reach    1. Lie flat on your back. This exercise is a very slight motion that starts with your arms raised (elbows straight, arms straight). 2. From this position, reach higher toward the omar or ceiling. Keep your elbows straight. All motion should be from your shoulder blade only. 3. Relax your arms back to where you started. 4. Repeat 8 to 12 times. Arm raise to the side    During this strengthening exercise, your arm should stay about 30 degrees tothe front of your side.   1. Slowly raise your injured arm to the side, with your thumb facing up. Raise your arm 60 degrees at the most (shoulder level is 90 degrees). 2. Hold the position for 3 to 5 seconds. Then lower your arm back to your side. If you need to, bring your \"good\" arm across your body and place it under the elbow as you lower your injured arm. Use your good arm to keep your injured arm from dropping down too fast.  3. Repeat 8 to 12 times. 4. When you first start out, don't hold any extra weight in your hand. As you get stronger, you may use a 1-pound to 2-pound dumbbell or a small can of food. Shoulder flexor and extensor exercise    These are isometric exercises. That means you contract your muscles withoutactually moving. 1. Push forward (flex): Stand facing a wall or doorjamb, about 6 inches or less back. Hold your injured arm against your body. Make a closed fist with your thumb on top. Then gently push your hand forward into the wall with about 25% to 50% of your strength. Don't let your body move backward as you push. Hold for about 6 seconds. Relax for a few seconds. Repeat 8 to 12 times. 2. Push backward (extend): Stand with your back flat against a wall. Your upper arm should be against the wall, with your elbow bent 90 degrees (your hand straight ahead). Push your elbow gently back against the wall with about 25% to 50% of your strength. Don't let your body move forward as you push. Hold for about 6 seconds. Relax for a few seconds. Repeat 8 to 12 times. Scapular exercise: Wall push-ups    This exercise is best done with your fingers somewhat turned out, rather thanstraight up and down. 1. Stand facing a wall, about 12 inches to 18 inches away. 2. Place your hands on the wall at shoulder height. 3. Slowly bend your elbows and bring your face to the wall. Keep your back and hips straight. 4. Push back to where you started. 5. Repeat 8 to 12 times. 6. When you can do this exercise against a wall comfortably, you can try it against a counter.  You between your elbow and your body for comfort. This will help keep your arm at your side. 3. Hold one end of the elastic band with the hand of the painful arm. 4. Start with your forearm across your belly. Slowly rotate the forearm out away from your body. Keep your elbow and upper arm tucked against the towel roll or the side of your body until you begin to feel tightness in your shoulder. Slowly move your arm back to where you started. 5. Repeat 8 to 12 times. Follow-up care is a key part of your treatment and safety. Be sure to make and go to all appointments, and call your doctor if you are having problems. It's also a good idea to know your test results and keep alist of the medicines you take. Where can you learn more? Go to https://CoinsetterpeLunagameseb.SpotHero. org and sign in to your [employer] account. Enter Nayely Suarez in the [employer] box to learn more about \"Rotator Cuff: Exercises. \"     If you do not have an account, please click on the \"Sign Up Now\" link. Current as of: March 9, 2022               Content Version: 13.3  © 7834-1415 Healthwise, [employer]. Care instructions adapted under license by [facility] ([facility]). If you have questions about a medical condition or this instruction, always ask your healthcare professional. [address] any warranty or liability for your use of this information. Patient Education        Rotator Cuff Rehabilitation  What is rotator cuff rehabilitation? Rotator cuff rehabilitation is a series of exercises you do after your surgery. It helps you get back your shoulder's range of motion and strength. You will work with your doctor and physical therapist to plan this exercise program. To get the best results, you need to do the exercises correctly and as often asyour doctor tells you. Before you start any exercises, talk with your doctor or physical therapist. It is important that you know exactly how to do the exercises.  Stop and call your doctor if you are not sure that you are doing the exercises correctly or if you have any pain. Hearing clicks and pops during exercise is not always cause for concern, but a grinding feeling may mean a more serious problem. Ice yourshoulder after exercising if it is sore. Follow-up care is a key part of your treatment and safety. Be sure to make and go to all appointments, and call your doctor if you are having problems. It's also a good idea to know your test results and keep alist of the medicines you take. Stretching exercises  Do not start doing stretching exercises until your doctor says you can. Your doctor will tell you which exercises to do, and how often and how long to dothem. Posterior stretch    Stand upright with your feet shoulder-width apart.  Put the hand of your affected arm on the opposite shoulder, and hold the elbow to your body.  Then, using your good arm, hold the elbow of your affected arm and move it gently up, away from, and across your body. External rotation    Hold a lightweight stick or glenda in your good arm. It should be about 2 feet long. A curtain glenda may work well.  Lie on your back with your elbows next to your sides. Rest the elbow of your affected arm on a small pillow or folded towel.  Set your arms so that the elbows are bent at a 90-degree angle, like the letter \"L. \" Your hands will point straight up.  Hold the stick with both hands. Use your good arm to push the stick toward the affected arm so the affected arm moves outward, away from your body. Stop when you feel the arm stretching. Strength exercises  Do not start strength exercises until your doctor says you can. Usually, this is several weeks after surgery. Your doctor will tell you how often and howlong to do the exercises. Arm raises to the side    Stand upright with your feet shoulder-width apart and your affected arm at your side.    Slowly raise your injured arm to the side, with your thumb facing up. Raise your arm 60 degrees at the most (shoulder level is 90 degrees).  After holding the position for 3 to 5 seconds, lower your arm back to your side. If you need to, bring your \"good\" arm across your body and place it under the elbow as you lower your injured arm. Use your good arm to keep your injured arm from dropping down too fast during the downward motion.  Repeat 8 to 12 times.  When you first start out, don't hold any additional weight in your hand. As your strength improves, you may use a 1- to 2-pound dumbbell or a small can of food. Shoulder flexor    Stand facing a wall. Your body should be about 6 inches away from the wall.  Keep your affected arm and elbow to your side, and bend your elbow so that your arm is pointing toward the wall.  Make a closed fist with your thumb on top.  Push your hand into the wall and hold it for 6 seconds. Push with 25% to 50% of the force you have. Shoulder extension    Stand with your back flat against a wall.  Keep your affected arm and elbow at your side, and bend your elbow so that your upper arm is against the wall and your lower arm is pointing straight ahead. Make a closed fist with your thumb on top.  Push your elbow gently back against the wall, holding for 6 seconds. Push with 25% to 50% of the force you have. Where can you learn more? Go to https://ACellpejoeeweb.[email or web address]. org and sign in to your [employer] account. Enter D856 in the [employer] box to learn more about \"Rotator Cuff Rehabilitation. \"     If you do not have an account, please click on the \"Sign Up Now\" link. Current as of: March 9, 2022               Content Version: 13.3  © 1836-7630 Healthwise, Incorporated. Care instructions adapted under license by [facility] ([facility]).  If you have questions about a medical condition or this instruction, always ask your healthcare professional. [address]  any warranty or liability for your use of this information.

## 2022-07-13 NOTE — PROGRESS NOTES
[facility] RHEUMATOLOGY FOLLOW UP NOTE       Date Of Service: 7/13/2022  Provider: Radha Gay DO    Name: Guzman Banks   MRN: [id number]    Chief Complaint(s)     Chief Complaint   Patient presents with    Follow-up     4 month         History of Present Illness (HPI)     Guzman Banks  is a(n)56 y.o. female with a hx of HTN, HLD, cervicalgia, fibromyalgia, +BRANDON, osteoarthritis, impingement syndrome of left shoulder here for the f/u evaluation of undifferentiated connective tissue disease      UCTD -- taking Methotrexate , folic acid. - dependent swelling of hands, ankles and feet for the past week. - pain  affecting the fingers, wrists, elbows, shoulders, hips, knees, ankles, toes, neck, back. With constant pain in the left elbows. Pain up to 8.5/10. Timing: afternoon and evening Aggravating factors: increased use, activity, cold exposure. Left shoulder - reaching above shoulder height  Alleviating factors: naproxen, gabapentin. Denies any sig. AM stiffness. -- myalgia / cramping intermittent in the legs. - can be worse w/ prolonged walking  -- continued dry  Eyes, fatigue, headaches (occiptual regiong)     Fibromyalgia - continued fatigue, arthralgia, insomnia, genearlized pain. raynauds improved w/ procardia - some increased lower ext swelling       REVIEW OF SYSTEMS: (ROS)    Review of Systems   Constitutional: Positive for fatigue. Negative for fever and unexpected weight change. HENT: Positive for tinnitus. Negative for congestion and trouble swallowing. Dry mouth   Eyes: Negative for pain and itching. Dry eyes   Respiratory: Negative for cough and shortness of breath. Cardiovascular: Negative for chest pain and leg swelling. Gastrointestinal: Negative for abdominal pain, constipation, diarrhea and nausea. Endocrine: Negative for cold intolerance and heat intolerance. Genitourinary: Negative for difficulty urinating, frequency and urgency. Musculoskeletal: Positive for arthralgias, joint swelling, myalgias and neck pain. Negative for back pain. Skin: Negative for rash. Neurological: Positive for weakness and headaches. Negative for dizziness and numbness. Psychiatric/Behavioral: The patient is nervous/anxious. PAST MEDICAL HISTORY    History reviewed. No pertinent past medical history. PAST SURGICAL HISTORY      Past Surgical History:   Procedure Laterality Date    HYSTERECTOMY, TOTAL ABDOMINAL (CERVIX REMOVED)  2006    bleeding, endomedtriosis       FAMILY HISTORY      Family History   Problem Relation Age of Onset    Heart Disease Father        SOCIAL HISTORY      Social History     Tobacco History     Smoking Status  Never Smoker    Smokeless Tobacco Use  Never Used          Alcohol History     Alcohol Use Status  Never          Drug Use     Drug Use Status  Never          Sexual Activity     Sexually Active  Not Asked                ALLERGIES     Allergies   Allergen Reactions    Latex     Asa [Aspirin]      Restless itchy    Codeine      shakes       CURRENT MEDICATIONS      Current Outpatient Medications   Medication Sig Dispense Refill    methotrexate (RHEUMATREX) 2.5 MG chemo tablet Take 8 tablets by mouth once a week Take 4 tablets in the morning and 4 tablets in the evening once weekly 32 tablet 2    NIFEdipine (PROCARDIA XL) 30 MG extended release tablet Take 1 tablet by mouth daily 30 tablet 2    folic acid (FOLVITE) 1 MG tablet Take 2 tablets by mouth daily 60 tablet 3    venlafaxine (EFFEXOR XR) 75 MG extended release capsule Take 150 mg by mouth daily       estradiol (ESTRACE) 2 MG tablet Take 2 mg by mouth daily      naproxen (NAPROSYN) 250 MG tablet Take 250 mg by mouth 3 times daily as needed for Pain      gabapentin (NEURONTIN) 600 MG tablet Take 1 tablet by mouth 3 times daily for 90 days.  90 tablet 2    amLODIPine (NORVASC) 2.5 MG tablet Take 1 tablet by mouth daily 30 tablet 2    bisoprolol (ZEBETA) 5 MG tablet Take 5 mg by mouth daily       Multiple Vitamins-Minerals (THERAPEUTIC MULTIVITAMIN-MINERALS) tablet Take 1 tablet by mouth daily        No current facility-administered medications for this visit. PHYSICAL EXAMINATION / OBJECTIVE   Objective:  /78   Pulse 68   Ht 5' 4.02\" (1.626 m)   Wt 133 lb 9.6 oz (60.6 kg)   SpO2 100%   BMI 22.92 kg/m²     Physical Exam  Vitals reviewed. Constitutional:       Appearance: She is well-developed. Cardiovascular:      Rate and Rhythm: Normal rate and regular rhythm. Pulmonary:      Effort: Pulmonary effort is normal.      Breath sounds: Normal breath sounds. Musculoskeletal:      Cervical back: Normal range of motion and neck supple. Skin:     General: Skin is warm and dry. Findings: No rash. Neurological:      Mental Status: She is alert and oriented to person, place, and time. Psychiatric:         Thought Content: Thought content normal.       Upper extremities:    Shoulder -tender bilat. + speed  Testing bilat, hawking bilat. Negative tripp, infraspinatou.    Elbows -tender bilateral  Hands - tender bilat mcps, pips, no swelling     Lower extremities:  HIPS tender bilat outer hips  KNEES tender knees  no swelling  ANKLES nontender  FEET : + MTP compression bilat     RAPID 3:   7/13/2022 --- RAPID 3: 2+ 8.5 + 8 = 18.5     Remission: <3  Low Disease Activity: <6  Moderate Disease Activity: >=6 and <=12  High Disease Activity: >12     LABS    CBC  Lab Results   Component Value Date/Time    WBC 5.7 06/14/2022 01:20 PM    WBC 4.5 12/21/2020 11:49 AM    RBC 4.18 06/14/2022 01:20 PM    HGB 12.7 06/14/2022 01:20 PM    HCT 39.3 06/14/2022 01:20 PM    MCV 94.0 06/14/2022 01:20 PM    MCH 30.4 06/14/2022 01:20 PM    MCHC 32.3 06/14/2022 01:20 PM    RDW 14.2 06/14/2022 01:20 PM     06/14/2022 01:20 PM     12/21/2020 11:49 AM       CMP  Lab Results   Component Value Date/Time    CALCIUM 9.1 06/14/2022 01:20 PM    LABALBU 4.7 06/14/2022 01:20 PM    PROT 6.8 06/14/2022 01:20 PM     06/14/2022 01:20 PM    K 4.8 06/14/2022 01:20 PM    CO2 29 06/14/2022 01:20 PM     06/14/2022 01:20 PM    BUN 8 06/14/2022 01:20 PM    CREATININE 0.72 06/14/2022 01:20 PM    ALKPHOS 93 06/14/2022 01:20 PM    ALKPHOS 60 12/21/2020 11:49 AM    ALT 21 06/14/2022 01:20 PM    AST 6 06/14/2022 01:20 PM       Lab Results   Component Value Date    SEDRATE 15 06/14/2022     Lab Results   Component Value Date    CRP <3 06/14/2022       RADIOLOGY:         ASSESSMENT/PLAN    Assessment   Plan     Undifferentiated connective tissue disease  - + BRANDON, strong positive SSA, polyarthralgia,  type hand changes, raynauds   - stopping methotrexate 20 mg PO weekly & folic acid 2 mg daily b/c of the lack of relief. - discussed restart of plaquenil depending upon how she is doing    Raynauds phenomenon - improved   - + color changes of hands. + nail capillary changes. - prior tx: norvasc 5 mg daily (night sweats)   - procardia 30 mg daily    Peripheral edema - ? 2/2 to Procardia -- continue close monitoring. Mild lower ext dependent edema. bilat shoulder pain - suspected impinagement. - prior treatment with steroid injection, relief with occupational therapy   - patient wanting to pursue home based therapy at this time. -stretches provided. - x-ray evaluation of bilateral shoulders     Fibromyalgia- active   - gabapentin 600 mg THREE TIME DAILY   - effexor - from pcp .    - discussed importance of exercising      Medication monitoring   - CBC, CMP, sed rate, CRP q 12 weeks      No follow-ups on file. Electronically signed by Jer Goetz DO on 7/13/2022 at 1:56 PM    New Prescriptions    No medications on file       Thank you for allowing me to participate in the care of this patient. Please call if there are any questions.

## 2022-08-03 RX ORDER — GABAPENTIN 600 MG/1
600 TABLET ORAL 3 TIMES DAILY
Qty: 90 TABLET | Refills: 2 | Status: SHIPPED | OUTPATIENT
Start: 2022-08-03 | End: 2022-11-01

## 2022-08-15 DIAGNOSIS — M35.9 CONNECTIVE TISSUE DISEASE (HCC): ICD-10-CM

## 2022-08-15 DIAGNOSIS — M25.512 CHRONIC PAIN OF BOTH SHOULDERS: ICD-10-CM

## 2022-08-15 DIAGNOSIS — M25.511 CHRONIC PAIN OF BOTH SHOULDERS: ICD-10-CM

## 2022-08-15 DIAGNOSIS — G89.29 CHRONIC PAIN OF BOTH SHOULDERS: ICD-10-CM

## 2022-08-24 LAB
ABSOLUTE BASO #: 0.04 K/UL (ref 0–0.2)
ABSOLUTE EOS #: 0.23 K/UL (ref 0–0.5)
ABSOLUTE LYMPH #: 2.35 K/UL (ref 1–4)
ABSOLUTE MONO #: 0.73 K/UL (ref 0.2–1)
ABSOLUTE NEUT #: 2.94 K/UL (ref 1.5–7.5)
BASOPHILS RELATIVE PERCENT: 0.6 %
C-REACTIVE PROTEIN: 5
EOSINOPHILS RELATIVE PERCENT: 3.6 %
HCT VFR BLD CALC: 36.6 % (ref 34–45)
HEMOGLOBIN: 12.2 G/DL (ref 11.5–15.5)
LYMPHOCYTE %: 37.2 %
MCH RBC QN AUTO: 30.3 PG (ref 25–33)
MCHC RBC AUTO-ENTMCNC: 33.3 G/DL (ref 31–36)
MCV RBC AUTO: 91 FL (ref 80–99)
MONOCYTES # BLD: 11.6 %
NEUTROPHILS RELATIVE PERCENT: 46.7 %
PDW BLD-RTO: 12.7 % (ref 11.5–15)
PLATELETS: 215 K/UL (ref 130–400)
PMV BLD AUTO: 12.5 FL (ref 9.3–13)
RBC: 4.02 M/UL (ref 3.8–5.4)
SEDIMENTATION RATE, ERYTHROCYTE: 13
WBC: 6.3 K/UL (ref 3.5–11)

## 2022-08-25 LAB
ALBUMIN SERPL-MCNC: 4.3 G/DL
ALBUMIN SERPL-MCNC: 4.3 G/DL (ref 3.5–5.2)
ALK PHOSPHATASE: 89 U/L (ref 40–136)
ALP BLD-CCNC: 89 U/L
ALT SERPL-CCNC: 19 U/L
ALT SERPL-CCNC: 19 U/L (ref 5–40)
ANION GAP SERPL CALCULATED.3IONS-SCNC: 7 MEQ/L (ref 7–16)
ANION GAP SERPL CALCULATED.3IONS-SCNC: 7 MMOL/L
AST SERPL-CCNC: 22 U/L
AST SERPL-CCNC: 22 U/L (ref 9–40)
BASOPHILS ABSOLUTE: 0.04 /ΜL
BASOPHILS RELATIVE PERCENT: 0.6 %
BILIRUB SERPL-MCNC: 0.2 MG/DL
BILIRUB SERPL-MCNC: 0.2 MG/DL (ref 0.1–1.4)
BUN BLDV-MCNC: 15 MG/DL
BUN BLDV-MCNC: 15 MG/DL (ref 6–20)
C-REACTIVE PROTEIN: <3 G/DL
CALCIUM SERPL-MCNC: 9.3 MG/DL
CALCIUM SERPL-MCNC: 9.3 MG/DL (ref 8.5–10.5)
CHLORIDE BLD-SCNC: 107 MEQ/L (ref 95–107)
CHLORIDE BLD-SCNC: 107 MMOL/L
CO2: 29 MEQ/L (ref 19–31)
CO2: 29 MMOL/L
CREAT SERPL-MCNC: 0.7 MG/DL
CREAT SERPL-MCNC: 0.7 MG/DL (ref 0.6–1.3)
EGFR IF NONAFRICAN AMERICAN: 101 ML/MIN/1.73
EOSINOPHILS ABSOLUTE: 0.23 /ΜL
EOSINOPHILS RELATIVE PERCENT: 3.6 %
GFR CALCULATED: 101
GLUCOSE BLD-MCNC: 72 MG/DL
GLUCOSE: 72 MG/DL (ref 70–99)
HCT VFR BLD CALC: 36.6 % (ref 36–46)
HEMOGLOBIN: 12.2 G/DL (ref 12–16)
LYMPHOCYTES ABSOLUTE: 2.35 /ΜL
LYMPHOCYTES RELATIVE PERCENT: 37.2 %
MCH RBC QN AUTO: 30.3 PG
MCHC RBC AUTO-ENTMCNC: 33.3 G/DL
MCV RBC AUTO: 91 FL
MONOCYTES ABSOLUTE: 0.73 /ΜL
MONOCYTES RELATIVE PERCENT: 11.6 %
NEUTROPHILS ABSOLUTE: 2.94 /ΜL
NEUTROPHILS RELATIVE PERCENT: 46.7 %
PDW BLD-RTO: 12.7 %
PLATELET # BLD: 215 K/ΜL
PMV BLD AUTO: 12.5 FL
POTASSIUM SERPL-SCNC: 4.6 MEQ/L (ref 3.5–5.4)
POTASSIUM SERPL-SCNC: 4.6 MMOL/L
RBC # BLD: 4.02 10^6/ΜL
SEDIMENTATION RATE, ERYTHROCYTE: 13 MM/HR (ref 0–20)
SODIUM BLD-SCNC: 143 MEQ/L (ref 133–146)
SODIUM BLD-SCNC: 143 MMOL/L
TOTAL PROTEIN: 6.6
TOTAL PROTEIN: 6.6 G/DL (ref 6.1–8.3)
WBC # BLD: 6.3 10^3/ML

## 2022-08-26 DIAGNOSIS — M35.9 CONNECTIVE TISSUE DISEASE (HCC): ICD-10-CM

## 2022-08-26 RX ORDER — FOLIC ACID 1 MG/1
2 TABLET ORAL DAILY
Qty: 60 TABLET | Refills: 3 | Status: SHIPPED | OUTPATIENT
Start: 2022-08-26

## 2022-08-30 ENCOUNTER — OFFICE VISIT (OUTPATIENT)
Dept: RHEUMATOLOGY | Age: 57
End: 2022-08-30
Payer: COMMERCIAL

## 2022-08-30 VITALS
HEART RATE: 68 BPM | BODY MASS INDEX: 22.94 KG/M2 | WEIGHT: 134.4 LBS | HEIGHT: 64 IN | SYSTOLIC BLOOD PRESSURE: 136 MMHG | OXYGEN SATURATION: 96 % | DIASTOLIC BLOOD PRESSURE: 68 MMHG

## 2022-08-30 DIAGNOSIS — M35.9 CONNECTIVE TISSUE DISEASE (HCC): Primary | ICD-10-CM

## 2022-08-30 DIAGNOSIS — Z51.81 MEDICATION MONITORING ENCOUNTER: ICD-10-CM

## 2022-08-30 DIAGNOSIS — I73.00 RAYNAUD'S PHENOMENON WITHOUT GANGRENE: ICD-10-CM

## 2022-08-30 DIAGNOSIS — R76.8 SS-A ANTIBODY POSITIVE: ICD-10-CM

## 2022-08-30 DIAGNOSIS — M79.7 FIBROMYALGIA: ICD-10-CM

## 2022-08-30 PROCEDURE — G8420 CALC BMI NORM PARAMETERS: HCPCS | Performed by: NURSE PRACTITIONER

## 2022-08-30 PROCEDURE — 99214 OFFICE O/P EST MOD 30 MIN: CPT | Performed by: NURSE PRACTITIONER

## 2022-08-30 PROCEDURE — G8427 DOCREV CUR MEDS BY ELIG CLIN: HCPCS | Performed by: NURSE PRACTITIONER

## 2022-08-30 PROCEDURE — 3017F COLORECTAL CA SCREEN DOC REV: CPT | Performed by: NURSE PRACTITIONER

## 2022-08-30 PROCEDURE — 1036F TOBACCO NON-USER: CPT | Performed by: NURSE PRACTITIONER

## 2022-08-30 RX ORDER — HYDROXYCHLOROQUINE SULFATE 200 MG/1
300 TABLET, FILM COATED ORAL DAILY
Qty: 45 TABLET | Refills: 3 | Status: SHIPPED | OUTPATIENT
Start: 2022-08-30 | End: 2022-08-30

## 2022-08-30 ASSESSMENT — ENCOUNTER SYMPTOMS
BACK PAIN: 0
SHORTNESS OF BREATH: 0
DIARRHEA: 0
CONSTIPATION: 0
ABDOMINAL PAIN: 0
TROUBLE SWALLOWING: 0
NAUSEA: 0
EYE PAIN: 0
EYE ITCHING: 0
COUGH: 0

## 2022-08-30 NOTE — PROGRESS NOTES
[facility] RHEUMATOLOGY FOLLOW UP NOTE       Date Of Service: 8/30/2022  Provider: JOVAN Rose - TAZ    Name: Radha Jaquez   MRN: [id number]    Chief Complaint(s)     Chief Complaint   Patient presents with    Follow-up     6 week        History of Present Illness (HPI)     Radha Jaquez  is a(n)56 y.o. female with a hx of HTN, HLD, cervicalgia, fibromyalgia, +BRANDON, osteoarthritis, impingement syndrome of left shoulder here for the f/u evaluation of undifferentiated connective tissue disease    Interval hx:    - stopped procardia due to leg swelling- raynauds currently not an issue   - off methotrexate since last visit due to no relief- now with significantly increased joint pains    pain affecting the fingers, left wrist, elbows, shoulders,   Pain on a scale 0-10: 6/10  Type of pain: intermittent  Timing: afternoon and evening  Aggravating factors: increased use, activity, cold exposure  Alleviating factors: naproxen, gabapentin    Associated symptoms:  denies swelling/  Redness/ warmth, + AM stiffness lasting 5-10 minutes      REVIEW OF SYSTEMS: (ROS)    Review of Systems   Constitutional:  Positive for fatigue. Negative for fever and unexpected weight change. HENT:  Positive for tinnitus. Negative for congestion and trouble swallowing. Dry mouth   Eyes:  Negative for pain and itching. Dry eyes   Respiratory:  Negative for cough and shortness of breath. Cardiovascular:  Negative for chest pain and leg swelling. Gastrointestinal:  Negative for abdominal pain, constipation, diarrhea and nausea. Endocrine: Negative for cold intolerance and heat intolerance. Genitourinary:  Negative for difficulty urinating, frequency and urgency. Musculoskeletal:  Positive for arthralgias, myalgias and neck pain. Negative for back pain and joint swelling. Skin:  Negative for rash. Neurological:  Positive for weakness and headaches. Negative for dizziness and numbness. Psychiatric/Behavioral:  The patient is nervous/anxious. PAST MEDICAL HISTORY    History reviewed. No pertinent past medical history. PAST SURGICAL HISTORY      Past Surgical History:   Procedure Laterality Date    HYSTERECTOMY, TOTAL ABDOMINAL (CERVIX REMOVED)  2006    bleeding, endomedtriosis       FAMILY HISTORY      Family History   Problem Relation Age of Onset    Heart Disease Father        SOCIAL HISTORY      Social History       Tobacco History       Smoking Status  Never Smoker      Smokeless Tobacco Use  Never Used              Alcohol History       Alcohol Use Status  Never              Drug Use       Drug Use Status  Never              Sexual Activity       Sexually Active  Not Asked                    ALLERGIES     Allergies   Allergen Reactions    Latex     Asa [Aspirin]      Restless itchy    Codeine      shakes       CURRENT MEDICATIONS      Current Outpatient Medications   Medication Sig Dispense Refill    methotrexate (RHEUMATREX) 2.5 MG chemo tablet Take 8 tablets by mouth once a week Take 4 tablets in the morning and 4 tablets in the evening once weekly 32 tablet 2    folic acid (FOLVITE) 1 MG tablet Take 2 tablets by mouth daily 60 tablet 3    gabapentin (NEURONTIN) 600 MG tablet Take 1 tablet by mouth in the morning and 1 tablet at noon and 1 tablet before bedtime. Do all this for 90 days. 90 tablet 2    venlafaxine (EFFEXOR XR) 75 MG extended release capsule Take 150 mg by mouth daily       estradiol (ESTRACE) 2 MG tablet Take 2 mg by mouth daily      bisoprolol (ZEBETA) 5 MG tablet Take 5 mg by mouth daily        No current facility-administered medications for this visit. PHYSICAL EXAMINATION / OBJECTIVE   Objective:  /68 (Site: Left Upper Arm, Position: Sitting, Cuff Size: Medium Adult)   Pulse 68   Ht 5' 4.02\" (1.626 m)   Wt 134 lb 6.4 oz (61 kg)   SpO2 96%   BMI 23.06 kg/m²     Physical Exam  Vitals reviewed.    Constitutional:       Appearance: She is well-developed. Cardiovascular:      Rate and Rhythm: Normal rate and regular rhythm. Pulmonary:      Effort: Pulmonary effort is normal.      Breath sounds: Normal breath sounds. Musculoskeletal:      Cervical back: Normal range of motion and neck supple. Skin:     General: Skin is warm and dry. Findings: No rash. Neurological:      Mental Status: She is alert and oriented to person, place, and time. Psychiatric:         Thought Content: Thought content normal.     Upper extremities:    SHOULDERS tender bilat, no swelling ,   ELBOWS tender left, ?  Mild warmth   WRISTS tender bilat, no swelling  HANDS/FINGERS   Tender bilat MCPS and PIPs, no swelling    Lower extremities:  HIPS tender bilat outer hips  KNEES nontender, no swelling  ANKLES nontender, no swelling   FEET : + MTP compression bilat and midfoot tenderness bilat    RAPID 3:   8/30/2022 --- RAPID 3: 1 + 6 + 5.5 = 12.5     Remission: <3  Low Disease Activity: <6  Moderate Disease Activity: >=6 and <=12  High Disease Activity: >12     LABS    CBC  Lab Results   Component Value Date/Time    WBC 6.3 08/25/2022 12:00 AM    RBC 4.02 08/25/2022 12:00 AM    RBC 4.02 08/24/2022 02:29 PM    HGB 12.2 08/25/2022 12:00 AM    HCT 36.6 08/25/2022 12:00 AM    MCV 91.0 08/25/2022 12:00 AM    MCH 30.3 08/25/2022 12:00 AM    MCHC 33.3 08/25/2022 12:00 AM    RDW 12.7 08/25/2022 12:00 AM     08/25/2022 12:00 AM       CMP  Lab Results   Component Value Date/Time    CALCIUM 9.3 08/25/2022 12:00 AM    LABALBU 4.3 08/25/2022 12:00 AM    PROT 6.6 08/24/2022 02:29 PM     08/25/2022 12:00 AM    K 4.6 08/25/2022 12:00 AM    CO2 29 08/25/2022 12:00 AM     08/25/2022 12:00 AM    BUN 15 08/25/2022 12:00 AM    CREATININE 0.70 08/25/2022 12:00 AM    ALKPHOS 89 08/25/2022 12:00 AM    ALT 19 08/25/2022 12:00 AM    AST 22 08/25/2022 12:00 AM       HgBA1c: No components found for: HGBA1C    No results found for: VITD25      No results found for: ANASCRN  No results found for: SSA  No results found for: SSB  No results found for: ANTI-SMITH  No results found for: DSDNAAB   No results found for: ANTIRNP  No results found for: C3, C4  No results found for: CCPAB  No results found for: RF    No components found for: CANCASCRN, APANCASCRN  Lab Results   Component Value Date    SEDRATE 13 08/24/2022     Lab Results   Component Value Date    CRP <3 08/24/2022       RADIOLOGY:         ASSESSMENT/PLAN    Assessment   Plan     Undifferentiated connective tissue disease  - + BRANDON, strong positive SSA, polyarthralgia,  type hand changes, raynauds   - restart methotrexate 20 mg PO weekly & folic acid 2 mg daily due to significantly increased joint pains since stopping    Raynauds phenomenon  - + color changes of hands. + nail capillary changes. - prior tx: norvasc 2.5 mg daily (night sweats), procardia 30 mg daily (leg swelling)   - holding off on additional tx options at this time as symptoms are stable    Fibromyalgia- active   - gabapentin 600 mg TID   - discussed importance of exercising    Medication monitoring   - CBC, CMP, sed rate, CRP q 12 weeks      No follow-ups on file. Electronically signed by JOVAN Greene CNP on 8/30/2022 at 2:37 PM    New Prescriptions    No medications on file       Thank you for allowing me to participate in the care of this patient. Please call if there are any questions.

## 2022-11-15 LAB — SEDIMENTATION RATE, ERYTHROCYTE: 11 MM/HR (ref 0–20)

## 2022-11-15 RX ORDER — GABAPENTIN 600 MG/1
600 TABLET ORAL 3 TIMES DAILY
Qty: 90 TABLET | Refills: 2 | Status: SHIPPED | OUTPATIENT
Start: 2022-11-15 | End: 2022-11-30 | Stop reason: SDUPTHER

## 2022-11-16 DIAGNOSIS — M35.9 CONNECTIVE TISSUE DISEASE (HCC): ICD-10-CM

## 2022-11-16 LAB
ABSOLUTE BASO #: 0.05 K/UL (ref 0–0.2)
ABSOLUTE EOS #: 0.19 K/UL (ref 0–0.5)
ABSOLUTE LYMPH #: 1.81 K/UL (ref 1–4)
ABSOLUTE MONO #: 0.61 K/UL (ref 0.2–1)
ABSOLUTE NEUT #: 2.67 K/UL (ref 1.5–7.5)
ALBUMIN SERPL-MCNC: 4.4 G/DL (ref 3.5–5.2)
ALK PHOSPHATASE: 77 U/L (ref 40–136)
ALT SERPL-CCNC: 18 U/L (ref 5–40)
ANION GAP SERPL CALCULATED.3IONS-SCNC: 9 MEQ/L (ref 7–16)
AST SERPL-CCNC: 17 U/L (ref 9–40)
BASOPHILS RELATIVE PERCENT: 0.9 %
BILIRUB SERPL-MCNC: 0.3 MG/DL
BUN BLDV-MCNC: 17 MG/DL (ref 6–20)
C-REACTIVE PROTEIN: 0.3 MG/DL
CALCIUM SERPL-MCNC: 9.4 MG/DL (ref 8.5–10.5)
CHLORIDE BLD-SCNC: 104 MEQ/L (ref 95–107)
CO2: 28 MEQ/L (ref 19–31)
CREAT SERPL-MCNC: 0.78 MG/DL (ref 0.6–1.3)
EGFR IF NONAFRICAN AMERICAN: 89 ML/MIN/1.73
EOSINOPHILS RELATIVE PERCENT: 3.5 %
GLUCOSE: 92 MG/DL (ref 70–99)
HCT VFR BLD CALC: 36.5 % (ref 34–45)
HEMOGLOBIN: 12.4 G/DL (ref 11.5–15.5)
LYMPHOCYTE %: 33.6 %
MCH RBC QN AUTO: 30.2 PG (ref 25–33)
MCHC RBC AUTO-ENTMCNC: 34 G/DL (ref 31–36)
MCV RBC AUTO: 89 FL (ref 80–99)
MONOCYTES # BLD: 11.3 %
NEUTROPHILS RELATIVE PERCENT: 49.6 %
PDW BLD-RTO: 14.4 % (ref 11.5–15)
PLATELETS: 249 K/UL (ref 130–400)
PMV BLD AUTO: 12.8 FL (ref 9.3–13)
POTASSIUM SERPL-SCNC: 4.7 MEQ/L (ref 3.5–5.4)
RBC: 4.1 M/UL (ref 3.8–5.4)
SODIUM BLD-SCNC: 141 MEQ/L (ref 133–146)
TOTAL PROTEIN: 6.6 G/DL (ref 6.1–8.3)
WBC: 5.4 K/UL (ref 3.5–11)

## 2022-11-30 ENCOUNTER — OFFICE VISIT (OUTPATIENT)
Dept: RHEUMATOLOGY | Age: 57
End: 2022-11-30
Payer: COMMERCIAL

## 2022-11-30 VITALS
SYSTOLIC BLOOD PRESSURE: 132 MMHG | WEIGHT: 133 LBS | HEIGHT: 64 IN | OXYGEN SATURATION: 98 % | HEART RATE: 89 BPM | BODY MASS INDEX: 22.71 KG/M2 | DIASTOLIC BLOOD PRESSURE: 80 MMHG

## 2022-11-30 DIAGNOSIS — I73.00 RAYNAUD'S PHENOMENON WITHOUT GANGRENE: ICD-10-CM

## 2022-11-30 DIAGNOSIS — M79.7 FIBROMYALGIA: ICD-10-CM

## 2022-11-30 DIAGNOSIS — M35.9 CONNECTIVE TISSUE DISEASE (HCC): Primary | ICD-10-CM

## 2022-11-30 DIAGNOSIS — R76.8 SS-A ANTIBODY POSITIVE: ICD-10-CM

## 2022-11-30 DIAGNOSIS — Z51.81 MEDICATION MONITORING ENCOUNTER: ICD-10-CM

## 2022-11-30 PROCEDURE — G8427 DOCREV CUR MEDS BY ELIG CLIN: HCPCS | Performed by: NURSE PRACTITIONER

## 2022-11-30 PROCEDURE — 99214 OFFICE O/P EST MOD 30 MIN: CPT | Performed by: NURSE PRACTITIONER

## 2022-11-30 PROCEDURE — G8484 FLU IMMUNIZE NO ADMIN: HCPCS | Performed by: NURSE PRACTITIONER

## 2022-11-30 PROCEDURE — 1036F TOBACCO NON-USER: CPT | Performed by: NURSE PRACTITIONER

## 2022-11-30 PROCEDURE — 3017F COLORECTAL CA SCREEN DOC REV: CPT | Performed by: NURSE PRACTITIONER

## 2022-11-30 PROCEDURE — G8420 CALC BMI NORM PARAMETERS: HCPCS | Performed by: NURSE PRACTITIONER

## 2022-11-30 RX ORDER — GABAPENTIN 800 MG/1
800 TABLET ORAL 3 TIMES DAILY
Qty: 90 TABLET | Refills: 2 | Status: SHIPPED | OUTPATIENT
Start: 2022-11-30 | End: 2023-02-28

## 2022-11-30 ASSESSMENT — ENCOUNTER SYMPTOMS
BACK PAIN: 0
EYE PAIN: 0
NAUSEA: 0
COUGH: 0
CONSTIPATION: 0
DIARRHEA: 0
SHORTNESS OF BREATH: 0
TROUBLE SWALLOWING: 0
ABDOMINAL PAIN: 0
EYE ITCHING: 0

## 2022-11-30 NOTE — PROGRESS NOTES
[facility] RHEUMATOLOGY FOLLOW UP NOTE       Date Of Service: 11/30/2022  Provider: JOVAN Sweeney - TAZ    Name: Skyler Roberts   MRN: [id number]    Chief Complaint(s)     Chief Complaint   Patient presents with    Follow-up     3 month f/u Connective tissue disease    Bilateral hands, elbows, knees         History of Present Illness (HPI)     Skyler Roberts  is a(n)57 y.o. female with a hx of HTN, HLD, cervicalgia, fibromyalgia, +BRANDON, osteoarthritis, impingement syndrome of left shoulder here for the f/u evaluation of undifferentiated connective tissue disease    Interval hx:    - restarted methotrexate with relief   - reports pain in both legs that starts from feet and works it's way up when walking. pain affecting the fingers, elbows, feet, back  Pain on a scale 0-10: 6/10  Type of pain: intermittent  Timing: afternoon and evening  Aggravating factors: increased use, activity, cold exposure  Alleviating factors: naproxen, gabapentin    Associated symptoms:  denies swelling/  Redness/ warmth, + AM stiffness lasting a couple minutes      REVIEW OF SYSTEMS: (ROS)    Review of Systems   Constitutional:  Positive for fatigue. Negative for fever and unexpected weight change. HENT:  Positive for tinnitus. Negative for congestion and trouble swallowing. Dry mouth   Eyes:  Negative for pain and itching. Dry eyes   Respiratory:  Negative for cough and shortness of breath. Cardiovascular:  Negative for chest pain and leg swelling. Gastrointestinal:  Negative for abdominal pain, constipation, diarrhea and nausea. Endocrine: Negative for cold intolerance and heat intolerance. Genitourinary:  Negative for difficulty urinating, frequency and urgency. Musculoskeletal:  Positive for arthralgias and myalgias. Negative for back pain, joint swelling and neck pain. Skin:  Negative for rash. Neurological:  Positive for weakness and headaches.  Negative for dizziness and numbness. Psychiatric/Behavioral:  The patient is nervous/anxious. PAST MEDICAL HISTORY    History reviewed. No pertinent past medical history. PAST SURGICAL HISTORY      Past Surgical History:   Procedure Laterality Date    HYSTERECTOMY, TOTAL ABDOMINAL (CERVIX REMOVED)  2006    bleeding, endomedtriosis       FAMILY HISTORY      Family History   Problem Relation Age of Onset    Heart Disease Father        SOCIAL HISTORY      Social History       Tobacco History       Smoking Status  Never Smoker      Smokeless Tobacco Use  Never Used              Alcohol History       Alcohol Use Status  Never              Drug Use       Drug Use Status  Never              Sexual Activity       Sexually Active  Not Asked                    ALLERGIES     Allergies   Allergen Reactions    Latex     Asa [Aspirin]      Restless itchy    Codeine      shakes       CURRENT MEDICATIONS      Current Outpatient Medications   Medication Sig Dispense Refill    gabapentin (NEURONTIN) 800 MG tablet Take 1 tablet by mouth 3 times daily for 90 days. 90 tablet 2    methotrexate (RHEUMATREX) 2.5 MG chemo tablet Take 8 tablets by mouth once a week Take 4 tablets in the morning and 4 tablets in the evening once weekly 32 tablet 2    folic acid (FOLVITE) 1 MG tablet Take 2 tablets by mouth daily 60 tablet 3    venlafaxine (EFFEXOR XR) 75 MG extended release capsule Take 150 mg by mouth daily       estradiol (ESTRACE) 2 MG tablet Take 2 mg by mouth daily      bisoprolol (ZEBETA) 5 MG tablet Take 5 mg by mouth daily        No current facility-administered medications for this visit. PHYSICAL EXAMINATION / OBJECTIVE   Objective:  /80 (Site: Left Upper Arm, Position: Sitting, Cuff Size: Medium Adult)   Pulse 89   Ht 5' 4.25\" (1.632 m)   Wt 133 lb (60.3 kg)   SpO2 98%   BMI 22.65 kg/m²     Physical Exam  Vitals reviewed. Constitutional:       Appearance: She is well-developed.    Cardiovascular:      Rate and Rhythm: Normal rate and regular rhythm. Pulmonary:      Effort: Pulmonary effort is normal.      Breath sounds: Normal breath sounds. Musculoskeletal:      Cervical back: Normal range of motion and neck supple. Skin:     General: Skin is warm and dry. Findings: No rash. Neurological:      Mental Status: She is alert and oriented to person, place, and time. Psychiatric:         Thought Content:  Thought content normal.     Upper extremities:    SHOULDERS tender bilat, no swelling   ELBOWS tender left  WRISTS tender bilat, no swelling  HANDS/FINGERS tender bilat MCPS and PIPs, no swelling    Lower extremities:  HIPS tender bilat outer hips  KNEES nontender, no swelling  ANKLES nontender, no swelling   FEET : + MTP compression bilat and midfoot tenderness bilat    + myofascial tenderness       LABS    CBC  Lab Results   Component Value Date/Time    WBC 5.4 11/15/2022 11:16 AM    WBC 6.3 08/25/2022 12:00 AM    RBC 4.10 11/15/2022 11:16 AM    HGB 12.4 11/15/2022 11:16 AM    HCT 36.5 11/15/2022 11:16 AM    MCV 89.0 11/15/2022 11:16 AM    MCH 30.2 11/15/2022 11:16 AM    MCHC 34.0 11/15/2022 11:16 AM    RDW 14.4 11/15/2022 11:16 AM     11/15/2022 11:16 AM     08/25/2022 12:00 AM       CMP  Lab Results   Component Value Date/Time    CALCIUM 9.4 11/15/2022 11:16 AM    LABALBU 4.4 11/15/2022 11:16 AM    PROT 6.6 11/15/2022 11:16 AM     11/15/2022 11:16 AM    K 4.7 11/15/2022 11:16 AM    CO2 28 11/15/2022 11:16 AM     11/15/2022 11:16 AM    BUN 17 11/15/2022 11:16 AM    CREATININE 0.78 11/15/2022 11:16 AM    ALKPHOS 77 11/15/2022 11:16 AM    ALKPHOS 89 08/25/2022 12:00 AM    ALT 18 11/15/2022 11:16 AM    AST 17 11/15/2022 11:16 AM       HgBA1c: No components found for: HGBA1C    No results found for: VITD25      No results found for: ANASCRN  No results found for: SSA  No results found for: SSB  No results found for: ANTI-SMITH  No results found for: DSDNAAB   No results found for: ANTIRNP  No results found for: C3, C4  No results found for: CCPAB  No results found for: RF    No components found for: CANCASCRN, APANCASCRN  Lab Results   Component Value Date    SEDRATE 11 11/15/2022     Lab Results   Component Value Date    CRP 0.3 11/15/2022       RADIOLOGY:         ASSESSMENT/PLAN    Assessment   Plan     Undifferentiated connective tissue disease  - + BRANDON, strong positive SSA, polyarthralgia,  type hand changes, raynauds   - methotrexate 20 mg PO weekly & folic acid 2 mg daily    Raynauds phenomenon  - + color changes of hands. + nail capillary changes. - prior tx: norvasc 2.5 mg daily (night sweats), procardia 30 mg daily (leg swelling)   - holding off on additional tx options at this time as symptoms are stable    Fibromyalgia- active   - increase gabapentin to 800 mg TID   - discussed importance of exercising    Medication monitoring   - CBC, CMP, sed rate, CRP q 12 weeks      No follow-ups on file. Electronically signed by JOVAN Bob CNP on 11/30/2022 at 3:04 PM    New Prescriptions    No medications on file       Thank you for allowing me to participate in the care of this patient. Please call if there are any questions.

## 2023-02-15 LAB
ABSOLUTE BASO #: 0.04 K/UL (ref 0–0.2)
ABSOLUTE EOS #: 0.13 K/UL (ref 0–0.5)
ABSOLUTE LYMPH #: 2.16 K/UL (ref 1–4)
ABSOLUTE MONO #: 0.58 K/UL (ref 0.2–1)
ABSOLUTE NEUT #: 2.45 K/UL (ref 1.5–7.5)
BASOPHILS RELATIVE PERCENT: 0.7 %
EOSINOPHILS RELATIVE PERCENT: 2.4 %
HCT VFR BLD CALC: 38 % (ref 34–45)
HEMOGLOBIN: 12.5 G/DL (ref 11.5–15.5)
LYMPHOCYTE %: 40 %
MCH RBC QN AUTO: 31.3 PG (ref 25–33)
MCHC RBC AUTO-ENTMCNC: 32.9 G/DL (ref 31–36)
MCV RBC AUTO: 95 FL (ref 80–99)
MONOCYTES # BLD: 10.7 %
NEUTROPHILS RELATIVE PERCENT: 45.5 %
PDW BLD-RTO: 13.7 % (ref 11.5–15)
PLATELETS: 269 K/UL (ref 130–400)
PMV BLD AUTO: 12.4 FL (ref 9.3–13)
RBC: 4 M/UL (ref 3.8–5.4)
SEDIMENTATION RATE, ERYTHROCYTE: 17 MM/HR (ref 0–20)
WBC: 5.4 K/UL (ref 3.5–11)

## 2023-02-16 DIAGNOSIS — M35.9 CONNECTIVE TISSUE DISEASE (HCC): ICD-10-CM

## 2023-02-16 LAB
ALBUMIN SERPL-MCNC: 4.5 G/DL (ref 3.5–5.2)
ALK PHOSPHATASE: 83 U/L (ref 40–136)
ALT SERPL-CCNC: 15 U/L (ref 5–40)
ANION GAP SERPL CALCULATED.3IONS-SCNC: 10 MEQ/L (ref 7–16)
AST SERPL-CCNC: 20 U/L (ref 9–40)
BILIRUB SERPL-MCNC: 0.2 MG/DL
BUN BLDV-MCNC: 16 MG/DL (ref 6–20)
C-REACTIVE PROTEIN: <0.3 MG/DL
CALCIUM SERPL-MCNC: 9 MG/DL (ref 8.5–10.5)
CHLORIDE BLD-SCNC: 104 MEQ/L (ref 95–107)
CO2: 25 MEQ/L (ref 19–31)
CREAT SERPL-MCNC: 0.85 MG/DL (ref 0.6–1.3)
EGFR IF NONAFRICAN AMERICAN: 80 ML/MIN/1.73
GLUCOSE: 88 MG/DL (ref 70–99)
POTASSIUM SERPL-SCNC: 4.2 MEQ/L (ref 3.5–5.4)
SODIUM BLD-SCNC: 139 MEQ/L (ref 133–146)
TOTAL PROTEIN: 6.8 G/DL (ref 6.1–8.3)

## 2023-02-28 ENCOUNTER — OFFICE VISIT (OUTPATIENT)
Dept: RHEUMATOLOGY | Age: 58
End: 2023-02-28
Payer: COMMERCIAL

## 2023-02-28 VITALS
WEIGHT: 137.2 LBS | OXYGEN SATURATION: 98 % | BODY MASS INDEX: 23.42 KG/M2 | HEART RATE: 77 BPM | SYSTOLIC BLOOD PRESSURE: 122 MMHG | DIASTOLIC BLOOD PRESSURE: 78 MMHG | HEIGHT: 64 IN

## 2023-02-28 DIAGNOSIS — M54.41 CHRONIC MIDLINE LOW BACK PAIN WITH BILATERAL SCIATICA: ICD-10-CM

## 2023-02-28 DIAGNOSIS — M62.81 PROXIMAL MUSCLE WEAKNESS: ICD-10-CM

## 2023-02-28 DIAGNOSIS — Z51.81 MEDICATION MONITORING ENCOUNTER: ICD-10-CM

## 2023-02-28 DIAGNOSIS — M79.7 FIBROMYALGIA: Primary | ICD-10-CM

## 2023-02-28 DIAGNOSIS — I73.00 RAYNAUD'S PHENOMENON WITHOUT GANGRENE: ICD-10-CM

## 2023-02-28 DIAGNOSIS — M35.9 CONNECTIVE TISSUE DISEASE (HCC): ICD-10-CM

## 2023-02-28 DIAGNOSIS — M65.4 DE QUERVAIN'S TENOSYNOVITIS: ICD-10-CM

## 2023-02-28 DIAGNOSIS — M54.42 CHRONIC MIDLINE LOW BACK PAIN WITH BILATERAL SCIATICA: ICD-10-CM

## 2023-02-28 DIAGNOSIS — G89.29 CHRONIC MIDLINE LOW BACK PAIN WITH BILATERAL SCIATICA: ICD-10-CM

## 2023-02-28 PROCEDURE — 3017F COLORECTAL CA SCREEN DOC REV: CPT | Performed by: INTERNAL MEDICINE

## 2023-02-28 PROCEDURE — 1036F TOBACCO NON-USER: CPT | Performed by: INTERNAL MEDICINE

## 2023-02-28 PROCEDURE — G8427 DOCREV CUR MEDS BY ELIG CLIN: HCPCS | Performed by: INTERNAL MEDICINE

## 2023-02-28 PROCEDURE — G8420 CALC BMI NORM PARAMETERS: HCPCS | Performed by: INTERNAL MEDICINE

## 2023-02-28 PROCEDURE — 99215 OFFICE O/P EST HI 40 MIN: CPT | Performed by: INTERNAL MEDICINE

## 2023-02-28 PROCEDURE — G8484 FLU IMMUNIZE NO ADMIN: HCPCS | Performed by: INTERNAL MEDICINE

## 2023-02-28 RX ORDER — NIFEDIPINE 30 MG/1
30 TABLET, EXTENDED RELEASE ORAL DAILY
Qty: 30 TABLET | Refills: 5 | Status: SHIPPED | OUTPATIENT
Start: 2023-02-28

## 2023-02-28 ASSESSMENT — ENCOUNTER SYMPTOMS
GASTROINTESTINAL NEGATIVE: 1
COLOR CHANGE: 1
RESPIRATORY NEGATIVE: 1
EYES NEGATIVE: 1

## 2023-02-28 NOTE — PROGRESS NOTES
[facility] RHEUMATOLOGY FOLLOW UP NOTE       Date Of Service: 2/28/2023  Provider: Mari Salvador DO ,   PCP: Ketty Malagon   Name: Manjinder Benítez   MRN: [id number]        History of Present Illness (HPI)     Chief Complaint   Patient presents with    Follow-up     3 month follow up        Manjinder Benítez  is a(n)57 y.o. female with a hx of  here for the f/u evaluation of her UCTD, fibromyalgia, chronic low back pain,  raynauds , medication monitoring      Intermittent oral sores - last occurrence around 2/7/2023- improvement w/ holding of the Methotrexate. She did notice worsening joint pains after holding of the Methotrexate. Arthralgia of the bilatearl hands, elbows, feet, posterior left heal, neck  Pain 5/10, constant, variable. Some numbness of the toes. Aggravating: weather changes. Swelling Right 2nd mcp (intermittent)   Raynauds -active bilat feet. , hands. Denies digital ulcers. + cracking of finger tips. Weakness in the hands w/ difficulty opening bottle tops and jar tops, difficulty w/ buttong. Weakness of the thighs with difficulty with getting from a seated position    Mid to lower back pain - worsened w/ lifting, prolonged walking. Pain along the outer hips into the toes with intial  wt bearing after prolonged sitting. Stiffness of the legs with prolonged walking.     + dry eyes and dry mouth         REVIEW OF SYSTEMS: (ROS)    Review of Systems   Constitutional:  Positive for fatigue. HENT: Negative. Eyes: Negative. Respiratory: Negative. Cardiovascular: Negative. Gastrointestinal: Negative. Endocrine: Negative. Genitourinary: Negative. Skin:  Positive for color change. Neurological: Negative. Hematological: Negative. PmHx:  has a past medical history of Connective tissue disease ([address] and Raynaud's phenomenon without gangrene. Social History:  reports that she has never smoked.  She has never used smokeless tobacco. She reports that she does not drink alcohol and does not use drugs. Allergies   Allergen Reactions    Latex     Asa [Aspirin]      Restless itchy    Codeine      shakes       CURRENT MEDICATIONS      Current Outpatient Medications:     methotrexate (RHEUMATREX) 2.5 MG chemo tablet, Take 8 tablets by mouth once a week Take 4 tablets in the morning and 4 tablets in the evening once weekly, Disp: 32 tablet, Rfl: 2    gabapentin (NEURONTIN) 800 MG tablet, Take 1 tablet by mouth 3 times daily for 90 days. , Disp: 90 tablet, Rfl: 2    folic acid (FOLVITE) 1 MG tablet, Take 2 tablets by mouth daily, Disp: 60 tablet, Rfl: 3    venlafaxine (EFFEXOR XR) 75 MG extended release capsule, Take 150 mg by mouth daily , Disp: , Rfl:     estradiol (ESTRACE) 2 MG tablet, Take 2 mg by mouth daily, Disp: , Rfl:     bisoprolol (ZEBETA) 5 MG tablet, Take 5 mg by mouth daily , Disp: , Rfl:       PHYSICAL EXAMINATION / OBJECTIVE     Objective:  /78 (Site: Right Upper Arm, Position: Sitting, Cuff Size: Large Adult)   Pulse 77   Ht 5' 4.25\" (1.632 m)   Wt 137 lb 3.2 oz (62.2 kg)   SpO2 98%   BMI 23.37 kg/m²     Physical Exam  Vitals reviewed. Constitutional:       Appearance: Normal appearance. HENT:      Head: Normocephalic. Nose: Nose normal.   Eyes:      Pupils: Pupils are equal, round, and reactive to light. Cardiovascular:      Rate and Rhythm: Normal rate. Heart sounds: No murmur heard. Pulmonary:      Effort: Pulmonary effort is normal.      Breath sounds: Normal breath sounds. Skin:     General: Skin is warm and dry. Comments: Palor and cyanosis of the toes > fingers bilat  Prominent palmar skin folds bilat hands   Neurological:      Mental Status: She is alert. Motor: Weakness present. Deep Tendon Reflexes: Reflexes normal. Babinski sign absent on the right side. Babinski sign absent on the left side. Reflex Scores:       Brachioradialis reflexes are 2+ on the right side and 2+ on the left side. Patellar reflexes are 2+ on the right side and 2+ on the left side. Achilles reflexes are 2+ on the right side and 2+ on the left side. Musculoskeletal:  Strenght 4+/5 bilat upper ext (bicpe, triceps,  strenght)   4/5 strenght w/ knee flexion,extension, hips flexion, extension. 4+/5 bilateral hip abd and add. SHOULDERS tender bl  ELBOWS tender right medial epicondyle   WRISTS   HANDS  tender & fullness 1,2 mcp,    HIPS      tender bilat. KNEES   nt  ANKLES nt   FEET :    tender mtps bilat, and right plantar aspect of the calcenus   SPINE:   tender upper trasp bilat. Physical Exam    There is currently no information documented on the homunculus.  Go to the Rheumatology activity and complete the homunculus joint exam.    ALEJANDRO-28 (ESR): --  ALEJANDRO-28 (CRP): --            LABS      Lab Results   Component Value Date    WBC 5.4 02/15/2023    HGB 12.5 02/15/2023    HGB 12.4 11/15/2022    HGB 12.2 08/25/2022    MCV 95.0 02/15/2023    MCHC 32.9 02/15/2023    RDW 13.7 02/15/2023     02/15/2023     11/15/2022     08/25/2022    NEUTROABS 2.45 02/15/2023    LYMPHSABS 2.16 02/15/2023    LYMPHSABS 1.81 11/15/2022    LYMPHSABS 2.35 08/25/2022    EOSABS 0.13 02/15/2023    BASOSABS 0.04 02/15/2023         Chemistry        Component Value Date/Time     02/15/2023 0955    K 4.2 02/15/2023 0955     02/15/2023 0955    CO2 25 02/15/2023 0955    BUN 16 02/15/2023 0955    CREATININE 0.85 02/15/2023 0955        Component Value Date/Time    CALCIUM 9.0 02/15/2023 0955    ALKPHOS 83 02/15/2023 0955    ALKPHOS 89 08/25/2022 0000    AST 20 02/15/2023 0955    ALT 15 02/15/2023 0955    BILITOT 0.2 02/15/2023 0955            Lab Results   Component Value Date    SEDRATE 17 02/15/2023    SEDRATE 11 11/15/2022    SEDRATE 13 08/24/2022    CRP <0.3 02/15/2023    CRP 0.3 11/15/2022    CRP <3 08/24/2022         RADIOLOGY / PROCEDURES:     X-ray bilateral shoulders: Minimal joint space narrowing of the bilateral shoulders  ASSESSMENT/PLAN:     1. Fibromyalgia      Undifferentiated connective tissue disease      - + BRANDON, strong positive SSA, polyarthralgia,  type hand changes, raynauds, dz    -- start Azathioprine 50mg daily 2/28/23 - titrate as tolerated and may try to wean off Methotrexate given the inability to titrated w/o oral sores. -- decrease methotrexate to 10 mg PO weekly (jan 2021 @ 10mg/wk --> April 7680 @ 21UM) +  folic acid 1 mg daily -- reported oral sores with 15 and 20mg of Methotrexate. -- alt treatment options - Azathioprine , Plaquenil. , low dose prednisone. -- myositis ab panel ordered b/c of weakness bilat proximal legs > upper ext. Raynauds phenomenon  - + color changes of hands. + nail capillary changes. - prior tx: norvasc 5mg (pitting edema)                - continue conservative measures   - d/c biprolol    - start procardia 30mg daily b/c active raynauds and paresthesia      Fibromyalgia               - continue gabapentin 800 mg TID    Lower back pain w/ radiculopathy and weakness of legs with walking. Intact DTRs and neg. Babinski. - x-ray ordered. - referral to phys therapy placed. - cont. Gabapentin 800mg three time daily. - if x-ray unrevealing MRI will possibly be obtained. Foot pain - ? Plantar fasciitis Right feet -- tender calcenus     Medication monitoring               - CBC, CMP, sed rate, CRP q 4 weeks x 3      No follow-ups on file. New Prescriptions    No medications on file       2/28/2023    Please contact the office if you have any questions or change of symptoms.

## 2023-03-02 RX ORDER — GABAPENTIN 800 MG/1
800 TABLET ORAL 3 TIMES DAILY
Qty: 90 TABLET | Refills: 2 | Status: SHIPPED | OUTPATIENT
Start: 2023-03-02 | End: 2023-05-31

## 2023-03-03 ENCOUNTER — TELEPHONE (OUTPATIENT)
Dept: RHEUMATOLOGY | Age: 58
End: 2023-03-03

## 2023-03-03 DIAGNOSIS — M35.9 CONNECTIVE TISSUE DISEASE (HCC): Primary | ICD-10-CM

## 2023-03-03 RX ORDER — HYDROXYCHLOROQUINE SULFATE 200 MG/1
TABLET, FILM COATED ORAL
Qty: 60 TABLET | Refills: 2 | Status: SHIPPED | OUTPATIENT
Start: 2023-03-03

## 2023-03-03 NOTE — TELEPHONE ENCOUNTER
1. Connective tissue disease ([facility]  -     hydroxychloroquine (PLAQUENIL) 200 MG tablet; Take 2 tablets by mouth three times a week AND 1 tablet four times a week. Take 2 tablets by mouth  Monday, tuesday, Wednesday, and one tablet by mouth Thursday through Sunday. ., Disp-60 tablet, R-2Normal

## 2023-03-03 NOTE — TELEPHONE ENCOUNTER
Pt called in with concerns of taking methotrexate and plaquenil together   Pt would like plaquenil sent to pharmacy

## 2023-03-06 ENCOUNTER — TELEPHONE (OUTPATIENT)
Dept: RHEUMATOLOGY | Age: 58
End: 2023-03-06

## 2023-03-06 NOTE — TELEPHONE ENCOUNTER
Pt called stating the her pharmacist told her she shouldn't take plaquenil and mtx together. I did advise the medication is usually safe together. I did advise her to give the pharmacist a call an update us on the response.

## 2023-03-20 LAB — TEST NAME: NORMAL

## 2023-04-19 LAB
ALBUMIN SERPL-MCNC: 4.7 G/DL (ref 3.5–5.2)
ALK PHOSPHATASE: 84 U/L (ref 40–136)
ALT SERPL-CCNC: 14 U/L (ref 5–40)
ANION GAP SERPL CALCULATED.3IONS-SCNC: 9 MEQ/L (ref 7–16)
AST SERPL-CCNC: 22 U/L (ref 9–40)
BILIRUB SERPL-MCNC: 0.3 MG/DL
BUN BLDV-MCNC: 9 MG/DL (ref 6–20)
CALCIUM SERPL-MCNC: 9.4 MG/DL (ref 8.5–10.5)
CHLORIDE BLD-SCNC: 102 MEQ/L (ref 95–107)
CO2: 26 MEQ/L (ref 19–31)
CREAT SERPL-MCNC: 0.68 MG/DL (ref 0.6–1.3)
EGFR IF NONAFRICAN AMERICAN: 102 ML/MIN/1.73
GLUCOSE: 87 MG/DL (ref 70–99)
POTASSIUM SERPL-SCNC: 4.3 MEQ/L (ref 3.5–5.4)
SEDIMENTATION RATE, ERYTHROCYTE: 18 MM/HR (ref 0–20)
SODIUM BLD-SCNC: 137 MEQ/L (ref 133–146)
TOTAL PROTEIN: 7.2 G/DL (ref 6.1–8.3)

## 2023-04-20 LAB
ABSOLUTE BASO #: 0.03 K/UL (ref 0–0.2)
ABSOLUTE EOS #: 0.1 K/UL (ref 0–0.5)
ABSOLUTE LYMPH #: 1.69 K/UL (ref 1–4)
ABSOLUTE MONO #: 0.42 K/UL (ref 0.2–1)
ABSOLUTE NEUT #: 3.2 K/UL (ref 1.5–7.5)
BASOPHILS RELATIVE PERCENT: 0.6 %
EOSINOPHILS RELATIVE PERCENT: 1.8 %
HCT VFR BLD CALC: 37.6 % (ref 34–45)
HEMOGLOBIN: 13 G/DL (ref 11.5–15.5)
LYMPHOCYTE %: 31 %
MCH RBC QN AUTO: 31.1 PG (ref 25–33)
MCHC RBC AUTO-ENTMCNC: 34.6 G/DL (ref 31–36)
MCV RBC AUTO: 90 FL (ref 80–99)
MONOCYTES # BLD: 7.7 %
NEUTROPHILS RELATIVE PERCENT: 58.7 %
PDW BLD-RTO: 12.4 % (ref 11.5–15)
PLATELETS: 292 K/UL (ref 130–400)
PMV BLD AUTO: 10.8 FL (ref 9.3–13)
RBC: 4.18 M/UL (ref 3.8–5.4)
WBC: 5.5 K/UL (ref 3.5–11)

## 2023-04-21 LAB
COMPLEMENT C3: 133 MG/DL (ref 90–180)
COMPLEMENT C4: 29 MG/DL (ref 10–40)

## 2023-04-25 ENCOUNTER — OFFICE VISIT (OUTPATIENT)
Dept: RHEUMATOLOGY | Age: 58
End: 2023-04-25
Payer: COMMERCIAL

## 2023-04-25 VITALS
BODY MASS INDEX: 22.77 KG/M2 | HEIGHT: 64 IN | DIASTOLIC BLOOD PRESSURE: 68 MMHG | SYSTOLIC BLOOD PRESSURE: 132 MMHG | OXYGEN SATURATION: 95 % | HEART RATE: 90 BPM | WEIGHT: 133.4 LBS

## 2023-04-25 DIAGNOSIS — M35.9 CONNECTIVE TISSUE DISEASE (HCC): Primary | ICD-10-CM

## 2023-04-25 DIAGNOSIS — I73.00 RAYNAUD'S PHENOMENON WITHOUT GANGRENE: ICD-10-CM

## 2023-04-25 DIAGNOSIS — Z51.81 MEDICATION MONITORING ENCOUNTER: ICD-10-CM

## 2023-04-25 DIAGNOSIS — G89.29 CHRONIC MIDLINE LOW BACK PAIN WITH BILATERAL SCIATICA: ICD-10-CM

## 2023-04-25 DIAGNOSIS — M54.42 CHRONIC MIDLINE LOW BACK PAIN WITH BILATERAL SCIATICA: ICD-10-CM

## 2023-04-25 DIAGNOSIS — M54.41 CHRONIC MIDLINE LOW BACK PAIN WITH BILATERAL SCIATICA: ICD-10-CM

## 2023-04-25 DIAGNOSIS — M79.7 FIBROMYALGIA: ICD-10-CM

## 2023-04-25 PROCEDURE — 1036F TOBACCO NON-USER: CPT | Performed by: INTERNAL MEDICINE

## 2023-04-25 PROCEDURE — G8420 CALC BMI NORM PARAMETERS: HCPCS | Performed by: INTERNAL MEDICINE

## 2023-04-25 PROCEDURE — 99214 OFFICE O/P EST MOD 30 MIN: CPT | Performed by: INTERNAL MEDICINE

## 2023-04-25 PROCEDURE — G8427 DOCREV CUR MEDS BY ELIG CLIN: HCPCS | Performed by: INTERNAL MEDICINE

## 2023-04-25 PROCEDURE — 3017F COLORECTAL CA SCREEN DOC REV: CPT | Performed by: INTERNAL MEDICINE

## 2023-04-25 ASSESSMENT — JOINT PAIN
TOTAL NUMBER OF TENDER JOINTS: 7
TOTAL NUMBER OF SWOLLEN JOINTS: 0

## 2023-04-25 ASSESSMENT — ENCOUNTER SYMPTOMS
COLOR CHANGE: 1
RESPIRATORY NEGATIVE: 1
GASTROINTESTINAL NEGATIVE: 1
EYES NEGATIVE: 1

## 2023-04-25 NOTE — PROGRESS NOTES
[facility] UP NOTE       Date Of Service: 4/25/2023  Provider: Bernard Reyes DO ,   PCP: Jackeline Phan   Name: Guillermo St   MRN: [id number]      Subjective     Chief Complaint   Patient presents with    Follow-up     8 wk f/u, Fibromyalgia, Connective tissue disease ([facility] [address]    Pt stated pain 2-3/10 - Bilateral hands (Rt worse than Lt), Lynette Carpio  is a(n)57 y.o. female here for the f/u evaluation of UCTD (? Sjogrens) , raynauds, fibromyalgia, lower back pain. Interval hx:     Uctd:   - tolerating Methotrexate    - itching eyes with allergies. + worsened dentition with increased cavities reported -- last evaluation 15 year ago and scheduled for a multiple filling in the near future. Also/diagnosed with periodontal disease.   - arthralgia - hands (mcps), knees, feet (mtps) - intermittent      low back pain improved w/ physical therapy w/ decreased leg weakness, improvement of ambulation. Raynauds - stable. W/ current medication           REVIEW OF SYSTEMS: (ROS)    Review of Systems   Constitutional:  Positive for fatigue. Eyes: Negative. Respiratory: Negative. Cardiovascular: Negative. Gastrointestinal: Negative. Endocrine: Negative. Genitourinary: Negative. Skin:  Positive for color change. PmHx:  has a past medical history of Connective tissue disease ([facility] and Raynaud's phenomenon without gangrene. Social History:  reports that she has never smoked. She has never used smokeless tobacco. She reports that she does not drink alcohol and does not use drugs. Allergies   Allergen Reactions    Latex     Asa [Aspirin]      Restless itchy    Codeine      shakes         Current Outpatient Medications:     hydroxychloroquine (PLAQUENIL) 200 MG tablet, Take 2 tablets by mouth three times a week AND 1 tablet four times a week.  Take 2 tablets by mouth  Monday, tuesday, Wednesday, and one tablet by mouth Thursday through

## 2023-05-24 RX ORDER — FOLIC ACID 1 MG/1
2 TABLET ORAL DAILY
Qty: 60 TABLET | Refills: 3 | Status: SHIPPED | OUTPATIENT
Start: 2023-05-24

## 2023-05-24 RX ORDER — GABAPENTIN 800 MG/1
800 TABLET ORAL 3 TIMES DAILY
Qty: 90 TABLET | Refills: 2 | Status: SHIPPED | OUTPATIENT
Start: 2023-05-24 | End: 2023-08-22

## 2023-05-26 LAB
ABSOLUTE BASO #: 0.03 K/UL (ref 0–0.2)
ABSOLUTE EOS #: 0.11 K/UL (ref 0–0.5)
ABSOLUTE LYMPH #: 1.24 K/UL (ref 1–4)
ABSOLUTE MONO #: 0.33 K/UL (ref 0.2–1)
ABSOLUTE NEUT #: 2.45 K/UL (ref 1.5–7.5)
BASOPHILS RELATIVE PERCENT: 0.7 %
CREATINE, URINE: 203.7 MG/DL
EOSINOPHILS RELATIVE PERCENT: 2.6 %
HCT VFR BLD CALC: 33.9 % (ref 34–45)
HEMOGLOBIN: 11.6 G/DL (ref 11.5–15.5)
LYMPHOCYTE %: 29.7 %
MCH RBC QN AUTO: 30.5 PG (ref 25–33)
MCHC RBC AUTO-ENTMCNC: 34.2 G/DL (ref 31–36)
MCV RBC AUTO: 89.2 FL (ref 80–99)
MONOCYTES # BLD: 7.9 %
NEUTROPHILS RELATIVE PERCENT: 58.6 %
PDW BLD-RTO: 13.1 % (ref 11.5–15)
PLATELETS: 298 K/UL (ref 130–400)
PMV BLD AUTO: 11.6 FL (ref 9.3–13)
PROTEIN, URINE: 15 MG/DL
PROTEIN/CREAT RATIO: 74 MG/G
RBC: 3.8 M/UL (ref 3.8–5.4)
SEDIMENTATION RATE, ERYTHROCYTE: 11 MM/HR (ref 0–20)
WBC: 4.2 K/UL (ref 3.5–11)

## 2023-05-27 LAB
ALBUMIN SERPL-MCNC: 4.2 G/DL (ref 3.5–5.2)
ALK PHOSPHATASE: 81 U/L (ref 40–136)
ALT SERPL-CCNC: 14 U/L (ref 5–40)
ANION GAP SERPL CALCULATED.3IONS-SCNC: 9 MEQ/L (ref 7–16)
APPEARANCE: CLEAR
AST SERPL-CCNC: 18 U/L (ref 9–40)
BACTERIA: ABNORMAL PER HPF
BACTERIA: ABNORMAL PER HPF
BILIRUB SERPL-MCNC: 0.3 MG/DL
BILIRUBIN: NEGATIVE
BUN BLDV-MCNC: 11 MG/DL (ref 6–20)
CALCIUM SERPL-MCNC: 9.4 MG/DL (ref 8.5–10.5)
CHLORIDE BLD-SCNC: 104 MEQ/L (ref 95–107)
CO2: 28 MEQ/L (ref 19–31)
COLOR: YELLOW
CREAT SERPL-MCNC: 0.72 MG/DL (ref 0.6–1.3)
EGFR IF NONAFRICAN AMERICAN: 97 ML/MIN/1.73
EPITHELIAL CELLS: ABNORMAL PER HPF (ref 0–10)
EPITHELIAL CELLS: ABNORMAL PER HPF (ref 0–10)
GLUCOSE BLD-MCNC: NEGATIVE MG/DL
GLUCOSE: 99 MG/DL (ref 70–99)
HYALINE CASTS: ABNORMAL
HYALINE CASTS: ABNORMAL
KETONES, URINE: NEGATIVE
LEUKOCYTE ESTERASE, URINE: NEGATIVE
NITRITE, URINE: NEGATIVE
OCCULT BLOOD,URINE: NEGATIVE
PH: 6 (ref 5–9)
POTASSIUM SERPL-SCNC: 4.5 MEQ/L (ref 3.5–5.4)
PROTEIN, URINE: ABNORMAL
RBC: ABNORMAL PER HPF (ref 0–5)
RBC: ABNORMAL PER HPF (ref 0–5)
SODIUM BLD-SCNC: 141 MEQ/L (ref 133–146)
SP GRAVITY MISCELLANEOUS: 1.02 (ref 1–1.03)
TOTAL PROTEIN: 6.4 G/DL (ref 6.1–8.3)
UROBILINOGEN, URINE: NORMAL
WBC: ABNORMAL PER HPF (ref 0–5)
WBC: ABNORMAL PER HPF (ref 0–5)

## 2023-05-30 DIAGNOSIS — M35.9 CONNECTIVE TISSUE DISEASE (HCC): ICD-10-CM

## 2023-05-30 LAB
COMPLEMENT C3: 109 MG/DL (ref 90–180)
COMPLEMENT C4: 24 MG/DL (ref 10–40)

## 2023-05-30 RX ORDER — HYDROXYCHLOROQUINE SULFATE 200 MG/1
TABLET, FILM COATED ORAL
Qty: 60 TABLET | Refills: 2 | Status: SHIPPED | OUTPATIENT
Start: 2023-05-30

## 2023-06-05 ENCOUNTER — PATIENT MESSAGE (OUTPATIENT)
Dept: RHEUMATOLOGY | Age: 58
End: 2023-06-05

## 2023-06-05 DIAGNOSIS — M35.9 CONNECTIVE TISSUE DISEASE (HCC): ICD-10-CM

## 2023-07-25 ENCOUNTER — OFFICE VISIT (OUTPATIENT)
Dept: RHEUMATOLOGY | Age: 58
End: 2023-07-25
Payer: COMMERCIAL

## 2023-07-25 VITALS
DIASTOLIC BLOOD PRESSURE: 80 MMHG | HEART RATE: 96 BPM | HEIGHT: 64 IN | WEIGHT: 133.8 LBS | BODY MASS INDEX: 22.84 KG/M2 | OXYGEN SATURATION: 97 % | SYSTOLIC BLOOD PRESSURE: 136 MMHG

## 2023-07-25 DIAGNOSIS — I73.00 RAYNAUD'S PHENOMENON WITHOUT GANGRENE: ICD-10-CM

## 2023-07-25 DIAGNOSIS — M79.7 FIBROMYALGIA: ICD-10-CM

## 2023-07-25 DIAGNOSIS — Z51.81 MEDICATION MONITORING ENCOUNTER: ICD-10-CM

## 2023-07-25 DIAGNOSIS — M35.9 CONNECTIVE TISSUE DISEASE (HCC): Primary | ICD-10-CM

## 2023-07-25 LAB
ABSOLUTE BASO #: 0.04 K/UL (ref 0–0.2)
ABSOLUTE EOS #: 0.16 K/UL (ref 0–0.5)
ABSOLUTE LYMPH #: 1.46 K/UL (ref 1–4)
ABSOLUTE MONO #: 0.52 K/UL (ref 0.2–1)
ABSOLUTE NEUT #: 3.36 K/UL (ref 1.5–7.5)
BASOPHILS RELATIVE PERCENT: 0.7 %
EOSINOPHILS RELATIVE PERCENT: 2.9 %
HCT VFR BLD CALC: 36.3 % (ref 34–45)
HEMOGLOBIN: 12.3 G/DL (ref 11.5–15.5)
LYMPHOCYTE %: 26.2 %
MCH RBC QN AUTO: 30.2 PG (ref 25–33)
MCHC RBC AUTO-ENTMCNC: 33.9 G/DL (ref 31–36)
MCV RBC AUTO: 89.2 FL (ref 80–99)
MONOCYTES # BLD: 9.3 %
NEUTROPHILS RELATIVE PERCENT: 60.4 %
PDW BLD-RTO: 14 % (ref 11.5–15)
PLATELETS: 253 K/UL (ref 130–400)
PMV BLD AUTO: 11.1 FL (ref 9.3–13)
RBC: 4.07 M/UL (ref 3.8–5.4)
SEDIMENTATION RATE, ERYTHROCYTE: 12 MM/HR (ref 0–20)
WBC: 5.6 K/UL (ref 3.5–11)

## 2023-07-25 PROCEDURE — G8420 CALC BMI NORM PARAMETERS: HCPCS | Performed by: INTERNAL MEDICINE

## 2023-07-25 PROCEDURE — 99214 OFFICE O/P EST MOD 30 MIN: CPT | Performed by: INTERNAL MEDICINE

## 2023-07-25 PROCEDURE — 3017F COLORECTAL CA SCREEN DOC REV: CPT | Performed by: INTERNAL MEDICINE

## 2023-07-25 PROCEDURE — G8427 DOCREV CUR MEDS BY ELIG CLIN: HCPCS | Performed by: INTERNAL MEDICINE

## 2023-07-25 PROCEDURE — 1036F TOBACCO NON-USER: CPT | Performed by: INTERNAL MEDICINE

## 2023-07-25 ASSESSMENT — ENCOUNTER SYMPTOMS
EYES NEGATIVE: 1
GASTROINTESTINAL NEGATIVE: 1
RESPIRATORY NEGATIVE: 1
COLOR CHANGE: 1

## 2023-07-25 ASSESSMENT — JOINT PAIN
TOTAL NUMBER OF TENDER JOINTS: 3
TOTAL NUMBER OF SWOLLEN JOINTS: 0

## 2023-07-25 NOTE — PROGRESS NOTES
CBC, CMP, sed rate, CRP q 4 weeks x 1 - awaitin labs from [facility] path labs. No follow-ups on file. New Prescriptions    No medications on file       7/25/2023    Please contact the office if you have any questions or change of symptoms.

## 2023-07-26 LAB
ALBUMIN SERPL-MCNC: 4.5 G/DL (ref 3.5–5.2)
ALK PHOSPHATASE: 73 U/L (ref 40–136)
ALT SERPL-CCNC: 19 U/L (ref 5–40)
ANION GAP SERPL CALCULATED.3IONS-SCNC: 10 MEQ/L (ref 7–16)
APPEARANCE: CLEAR
AST SERPL-CCNC: 22 U/L (ref 9–40)
BACTERIA: ABNORMAL PER HPF
BILIRUB SERPL-MCNC: 0.2 MG/DL
BILIRUBIN: NEGATIVE
BUN BLDV-MCNC: 14 MG/DL (ref 6–20)
CALCIUM SERPL-MCNC: 9.4 MG/DL (ref 8.5–10.5)
CHLORIDE BLD-SCNC: 106 MEQ/L (ref 95–107)
CO2: 25 MEQ/L (ref 19–31)
COLOR: YELLOW
CREAT SERPL-MCNC: 0.77 MG/DL (ref 0.6–1.3)
CREATINE, URINE: 206.5 MG/DL
EGFR IF NONAFRICAN AMERICAN: 90 ML/MIN/1.73
EPITHELIAL CELLS: ABNORMAL PER HPF (ref 0–10)
GLUCOSE BLD-MCNC: NEGATIVE MG/DL
GLUCOSE: 92 MG/DL (ref 70–99)
HYALINE CASTS: ABNORMAL
KETONES, URINE: NEGATIVE
LEUKOCYTE ESTERASE, URINE: NEGATIVE
NITRITE, URINE: NEGATIVE
OCCULT BLOOD,URINE: NEGATIVE
PH: 6 (ref 5–9)
POTASSIUM SERPL-SCNC: 4.7 MEQ/L (ref 3.5–5.4)
PROTEIN, URINE: 22 MG/DL
PROTEIN, URINE: ABNORMAL
PROTEIN/CREAT RATIO: 107 MG/G
RBC: ABNORMAL PER HPF (ref 0–5)
SODIUM BLD-SCNC: 141 MEQ/L (ref 133–146)
SP GRAVITY MISCELLANEOUS: 1.03 (ref 1–1.03)
TOTAL PROTEIN: 6.5 G/DL (ref 6.1–8.3)
UROBILINOGEN, URINE: NORMAL
WBC: ABNORMAL PER HPF (ref 0–5)

## 2023-07-27 LAB
COMPLEMENT C3: 114 MG/DL (ref 90–180)
COMPLEMENT C4: 24 MG/DL (ref 10–40)

## 2023-07-28 ENCOUNTER — PATIENT MESSAGE (OUTPATIENT)
Dept: RHEUMATOLOGY | Age: 58
End: 2023-07-28

## 2023-07-28 NOTE — TELEPHONE ENCOUNTER
From: Lei Whiteside  To:  Karin Ivan  Sent: 7/28/2023 9:12 AM EDT  Subject: methotrexate    sorry i forgot to ask if you could please send this to [address] [place] [address] in [facility]

## 2023-07-28 NOTE — TELEPHONE ENCOUNTER
From: Casey Whiteside  To:  Cata Arguello  Sent: 7/28/2023 9:06 AM EDT  Subject: methotrexate    Hi, i got my blood work done on July 25. i never got a prescription called infor the methotrexate

## 2023-08-14 DIAGNOSIS — I73.00 RAYNAUD'S PHENOMENON WITHOUT GANGRENE: ICD-10-CM

## 2023-08-14 RX ORDER — NIFEDIPINE 30 MG/1
30 TABLET, EXTENDED RELEASE ORAL DAILY
Qty: 30 TABLET | Refills: 5 | Status: SHIPPED | OUTPATIENT
Start: 2023-08-14

## 2023-08-15 RX ORDER — GABAPENTIN 800 MG/1
800 TABLET ORAL 3 TIMES DAILY
Qty: 90 TABLET | Refills: 2 | Status: SHIPPED | OUTPATIENT
Start: 2023-08-15 | End: 2023-11-13

## 2023-08-18 DIAGNOSIS — M35.9 CONNECTIVE TISSUE DISEASE (HCC): ICD-10-CM

## 2023-08-21 RX ORDER — HYDROXYCHLOROQUINE SULFATE 200 MG/1
TABLET, FILM COATED ORAL
Qty: 60 TABLET | Refills: 3 | Status: SHIPPED | OUTPATIENT
Start: 2023-08-21

## 2023-08-29 DIAGNOSIS — M35.9 CONNECTIVE TISSUE DISEASE (HCC): ICD-10-CM

## 2023-08-29 NOTE — TELEPHONE ENCOUNTER
DOLV: 7/25/23  DONV: 11/28/23  LAST LAB DRAW: 7/25/23  LAST TB TEST: N/A    Lab Results   Component Value Date     07/25/2023    K 4.7 07/25/2023     07/25/2023    CO2 25 07/25/2023    BUN 14 07/25/2023    CREATININE 0.77 07/25/2023    GLUCOSE 92 07/25/2023    GLUCOSE NEGATIVE 07/25/2023    CALCIUM 9.4 07/25/2023    PROT 6.5 07/25/2023    LABALBU 4.5 07/25/2023    BILITOT 0.2 07/25/2023    BILITOT NEGATIVE 07/25/2023    ALKPHOS 73 07/25/2023    AST 22 07/25/2023    ALT 19 07/25/2023    LABGLOM 101 08/25/2022       No results for input(s): WBC, HGB, HCT, MCV, PLT in the last 720 hours.     Lab Results   Component Value Date    SEDRATE 12 07/25/2023       Lab Results   Component Value Date    CRP <0.3 02/15/2023

## 2023-11-01 LAB
ABSOLUTE BASO #: 0.04 K/UL (ref 0–0.2)
ABSOLUTE EOS #: 0.13 K/UL (ref 0–0.5)
ABSOLUTE LYMPH #: 1.73 K/UL (ref 1–4)
ABSOLUTE MONO #: 0.51 K/UL (ref 0.2–1)
ABSOLUTE NEUT #: 2.7 K/UL (ref 1.5–7.5)
ALBUMIN SERPL-MCNC: 4.5 G/DL (ref 3.5–5.2)
ALK PHOSPHATASE: 75 U/L (ref 40–136)
ALT SERPL-CCNC: 18 U/L (ref 5–40)
ANION GAP SERPL CALCULATED.3IONS-SCNC: 8 MEQ/L (ref 7–16)
APPEARANCE: CLEAR
AST SERPL-CCNC: 20 U/L (ref 9–40)
BACTERIA: ABNORMAL PER HPF
BASOPHILS RELATIVE PERCENT: 0.8 %
BILIRUB SERPL-MCNC: 0.3 MG/DL
BILIRUBIN: NEGATIVE
BUN BLDV-MCNC: 10 MG/DL (ref 6–20)
CALCIUM SERPL-MCNC: 9.7 MG/DL (ref 8.5–10.5)
CHLORIDE BLD-SCNC: 103 MEQ/L (ref 95–107)
CO2: 29 MEQ/L (ref 19–31)
COLOR: YELLOW
CREAT SERPL-MCNC: 0.74 MG/DL (ref 0.6–1.3)
CREATINE, URINE: 136.2 MG/DL
EGFR IF NONAFRICAN AMERICAN: 94 ML/MIN/1.73
EOSINOPHILS RELATIVE PERCENT: 2.5 %
EPITHELIAL CELLS: ABNORMAL PER HPF (ref 0–10)
GLUCOSE BLD-MCNC: NEGATIVE MG/DL
GLUCOSE: 85 MG/DL (ref 70–99)
HCT VFR BLD CALC: 39 % (ref 34–45)
HEMOGLOBIN: 13.1 G/DL (ref 11.5–15.5)
HYALINE CASTS: ABNORMAL
KETONES, URINE: NEGATIVE
LEUKOCYTE ESTERASE, URINE: NEGATIVE
LYMPHOCYTE %: 33.7 %
MCH RBC QN AUTO: 30.6 PG (ref 25–33)
MCHC RBC AUTO-ENTMCNC: 33.6 G/DL (ref 31–36)
MCV RBC AUTO: 91.1 FL (ref 80–99)
MONOCYTES # BLD: 9.9 %
NEUTROPHILS RELATIVE PERCENT: 52.7 %
NITRITE, URINE: NEGATIVE
OCCULT BLOOD,URINE: NEGATIVE
PDW BLD-RTO: 12.7 % (ref 11.5–15)
PH: 7.5 (ref 5–9)
PLATELETS: 289 K/UL (ref 130–400)
PMV BLD AUTO: 11.8 FL (ref 9.3–13)
POTASSIUM SERPL-SCNC: 5.3 MEQ/L (ref 3.5–5.4)
PROTEIN, URINE: 14 MG/DL
PROTEIN, URINE: ABNORMAL
PROTEIN/CREAT RATIO: 103 MG/G
RBC: 4.28 M/UL (ref 3.8–5.4)
RBC: ABNORMAL PER HPF (ref 0–5)
SEDIMENTATION RATE, ERYTHROCYTE: 15 MM/HR (ref 0–20)
SODIUM BLD-SCNC: 140 MEQ/L (ref 133–146)
SP GRAVITY MISCELLANEOUS: 1.02 (ref 1–1.03)
TOTAL PROTEIN: 7 G/DL (ref 6.1–8.3)
UROBILINOGEN, URINE: NORMAL
WBC: 5.1 K/UL (ref 3.5–11)
WBC: ABNORMAL PER HPF (ref 0–5)

## 2023-11-02 LAB
COMPLEMENT C3: 121 MG/DL (ref 90–180)
COMPLEMENT C4: 24 MG/DL (ref 10–40)

## 2023-11-03 DIAGNOSIS — M35.9 CONNECTIVE TISSUE DISEASE (HCC): ICD-10-CM

## 2023-11-03 RX ORDER — HYDROXYCHLOROQUINE SULFATE 200 MG/1
TABLET, FILM COATED ORAL
Qty: 180 TABLET | Refills: 1 | Status: SHIPPED | OUTPATIENT
Start: 2023-11-03

## 2023-11-03 RX ORDER — GABAPENTIN 800 MG/1
800 TABLET ORAL 3 TIMES DAILY
Qty: 90 TABLET | Refills: 2 | Status: SHIPPED | OUTPATIENT
Start: 2023-11-03 | End: 2024-02-01

## 2023-11-28 ENCOUNTER — OFFICE VISIT (OUTPATIENT)
Dept: RHEUMATOLOGY | Age: 58
End: 2023-11-28
Payer: COMMERCIAL

## 2023-11-28 VITALS
DIASTOLIC BLOOD PRESSURE: 80 MMHG | HEIGHT: 64 IN | BODY MASS INDEX: 22.77 KG/M2 | SYSTOLIC BLOOD PRESSURE: 138 MMHG | HEART RATE: 88 BPM | WEIGHT: 133.4 LBS | OXYGEN SATURATION: 98 %

## 2023-11-28 DIAGNOSIS — M35.9 CONNECTIVE TISSUE DISEASE (HCC): Primary | ICD-10-CM

## 2023-11-28 DIAGNOSIS — M79.7 FIBROMYALGIA: ICD-10-CM

## 2023-11-28 DIAGNOSIS — I73.00 RAYNAUD'S PHENOMENON WITHOUT GANGRENE: ICD-10-CM

## 2023-11-28 DIAGNOSIS — Z51.81 MEDICATION MONITORING ENCOUNTER: ICD-10-CM

## 2023-11-28 PROCEDURE — 99214 OFFICE O/P EST MOD 30 MIN: CPT | Performed by: INTERNAL MEDICINE

## 2023-11-28 PROCEDURE — G8420 CALC BMI NORM PARAMETERS: HCPCS | Performed by: INTERNAL MEDICINE

## 2023-11-28 PROCEDURE — G8427 DOCREV CUR MEDS BY ELIG CLIN: HCPCS | Performed by: INTERNAL MEDICINE

## 2023-11-28 PROCEDURE — G8484 FLU IMMUNIZE NO ADMIN: HCPCS | Performed by: INTERNAL MEDICINE

## 2023-11-28 PROCEDURE — 1036F TOBACCO NON-USER: CPT | Performed by: INTERNAL MEDICINE

## 2023-11-28 PROCEDURE — 3017F COLORECTAL CA SCREEN DOC REV: CPT | Performed by: INTERNAL MEDICINE

## 2023-11-28 ASSESSMENT — ENCOUNTER SYMPTOMS
GASTROINTESTINAL NEGATIVE: 1
RESPIRATORY NEGATIVE: 1
COLOR CHANGE: 1
EYES NEGATIVE: 1

## 2023-11-28 NOTE — PROGRESS NOTES
[facility] RHEUMATOLOGY FOLLOW UP NOTE       Date Of Service: 11/28/2023  Provider: Daryl Kendrick DO, DO  PCP: Elder Mccarty   Name: Luz Urena   MRN: [id number]      Subjective     Chief Complaint   Patient presents with    Follow-up     4 month follow up connective tissue disease        Luz Urena  is a(n)58 y.o. female here for the f/u evaluation of UCTD (? Sjogrens) , raynauds, fibromyalgia, lower back pain. Interval hx:    -Patient reports stopping the methotrexate around July 2023 as she was feeling better and felt as though she should not feel this way. Since discontinuation of the medications she has had worsening joint pains- currently with pain in the bilateral hands, bilateral feet , right elbows, left shoulder blade. - pain up to 5/10 over the past week. Aggravating - layin on hips. Hands- increased use, cold exposure. Feet: wt bearing. Alleviating: feet - non-wt bearing. Warming. Active dry eyes and dry mouth. ,     Raynauds - stable. W/ current medication  with associated numbness/tinglin inf the feet. REVIEW OF SYSTEMS: (ROS)    Review of Systems   Constitutional:  Positive for fatigue. Eyes: Negative. Respiratory: Negative. Cardiovascular: Negative. Gastrointestinal: Negative. Endocrine: Negative. Genitourinary: Negative. Skin:  Positive for color change. Neurological:  Positive for numbness. PmHx:  has a past medical history of Connective tissue disease ([address]) and Raynaud's phenomenon without gangrene. Social History:  reports that she has never smoked. She has never used smokeless tobacco. She reports that she does not drink alcohol and does not use drugs. Allergies   Allergen Reactions    Latex     Asa [Aspirin]      Restless itchy    Codeine      shakes         Current Outpatient Medications:     gabapentin (NEURONTIN) 800 MG tablet, Take 1 tablet by mouth 3 times daily for 90 days. , Disp: 90 tablet, Rfl: 2

## 2023-12-06 RX ORDER — FOLIC ACID 1 MG/1
2 TABLET ORAL DAILY
Qty: 60 TABLET | Refills: 3 | Status: SHIPPED | OUTPATIENT
Start: 2023-12-06

## 2024-01-02 LAB
ABSOLUTE BASO #: 0.02 K/UL (ref 0–0.2)
ABSOLUTE EOS #: 0.14 K/UL (ref 0–0.5)
ABSOLUTE LYMPH #: 1.7 K/UL (ref 1–4)
ABSOLUTE MONO #: 0.49 K/UL (ref 0.2–1)
ABSOLUTE NEUT #: 2.21 K/UL (ref 1.5–7.5)
BASOPHILS RELATIVE PERCENT: 0.4 %
EOSINOPHILS RELATIVE PERCENT: 3.1 %
HCT VFR BLD CALC: 37.8 % (ref 34–45)
HEMOGLOBIN: 12.7 G/DL (ref 11.5–15.5)
LYMPHOCYTE %: 37.1 %
MCH RBC QN AUTO: 30 PG (ref 25–33)
MCHC RBC AUTO-ENTMCNC: 33.6 G/DL (ref 31–36)
MCV RBC AUTO: 89.4 FL (ref 80–99)
MONOCYTES # BLD: 10.7 %
NEUTROPHILS RELATIVE PERCENT: 48.3 %
PDW BLD-RTO: 12.8 % (ref 11.5–15)
PLATELETS: 270 K/UL (ref 130–400)
PMV BLD AUTO: 11.7 FL (ref 9.3–13)
RBC: 4.23 M/UL (ref 3.8–5.4)
SEDIMENTATION RATE, ERYTHROCYTE: 10 MM/HR (ref 0–20)
WBC: 4.6 K/UL (ref 3.5–11)

## 2024-01-03 LAB
ALBUMIN SERPL-MCNC: 4.6 G/DL (ref 3.5–5.2)
ALK PHOSPHATASE: 84 U/L (ref 40–136)
ALT SERPL-CCNC: 12 U/L (ref 5–40)
ANION GAP SERPL CALCULATED.3IONS-SCNC: 8 MEQ/L (ref 7–16)
APPEARANCE: CLEAR
AST SERPL-CCNC: 18 U/L (ref 9–40)
BILIRUB SERPL-MCNC: 0.1 MG/DL
BILIRUBIN: NEGATIVE
BUN BLDV-MCNC: 17 MG/DL (ref 6–20)
CALCIUM SERPL-MCNC: 9.9 MG/DL (ref 8.5–10.5)
CHLORIDE BLD-SCNC: 104 MEQ/L (ref 95–107)
CO2: 29 MEQ/L (ref 19–31)
COLOR: YELLOW
CREAT SERPL-MCNC: 0.7 MG/DL (ref 0.6–1.3)
CREATINE, URINE: 111.7 MG/DL
EGFR IF NONAFRICAN AMERICAN: 100 ML/MIN/1.73
GLUCOSE BLD-MCNC: NEGATIVE MG/DL
GLUCOSE: 85 MG/DL (ref 70–99)
KETONES, URINE: NEGATIVE
LEUKOCYTE ESTERASE, URINE: NEGATIVE
NITRITE, URINE: NEGATIVE
OCCULT BLOOD,URINE: NEGATIVE
PH: 6 (ref 5–9)
POTASSIUM SERPL-SCNC: 5.3 MEQ/L (ref 3.5–5.4)
PROTEIN, URINE: 9 MG/DL
PROTEIN, URINE: NEGATIVE
PROTEIN/CREAT RATIO: 81 MG/G
SODIUM BLD-SCNC: 141 MEQ/L (ref 133–146)
SP GRAVITY MISCELLANEOUS: 1.02 (ref 1–1.03)
TOTAL PROTEIN: 6.6 G/DL (ref 6.1–8.3)
UROBILINOGEN, URINE: NORMAL

## 2024-01-04 LAB
COMPLEMENT C3: 117 MG/DL (ref 90–180)
COMPLEMENT C4: 24 MG/DL (ref 10–40)

## 2024-01-04 RX ORDER — METHOTREXATE 2.5 MG/1
20 TABLET ORAL WEEKLY
Qty: 32 TABLET | Refills: 1 | Status: SHIPPED | OUTPATIENT
Start: 2024-01-04 | End: 2024-02-03

## 2024-01-16 ENCOUNTER — HOSPITAL ENCOUNTER (OUTPATIENT)
Dept: MAMMOGRAPHY | Age: 59
Discharge: HOME | End: 2024-01-16
Payer: COMMERCIAL

## 2024-01-16 DIAGNOSIS — Z12.31 SCREENING MAMMOGRAM FOR HIGH-RISK PATIENT: ICD-10-CM

## 2024-01-16 PROCEDURE — 77063 BREAST TOMOSYNTHESIS BI: CPT

## 2024-01-31 RX ORDER — GABAPENTIN 800 MG/1
800 TABLET ORAL 3 TIMES DAILY
Qty: 90 TABLET | Refills: 2 | Status: SHIPPED | OUTPATIENT
Start: 2024-01-31 | End: 2024-04-30

## 2024-02-14 ENCOUNTER — TELEPHONE (OUTPATIENT)
Dept: RHEUMATOLOGY | Age: 59
End: 2024-02-14

## 2024-02-14 NOTE — TELEPHONE ENCOUNTER
Dr Lama called stating he is following her for plaquenil eye exam. Patient had normal eye exam in August 2023. Patient called his office last week complaining her vision seemed funny. Patient was evaluated yesterday and OCT showed change. 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 He feels the plaquenil is affecting patient vision. Her Ganglion cell thickness is becoming think in the left eye. Dr Lama believes patient should not continue with the plaquenil. Dr Lama will send notes from August and yesterdays notes when completed. Please advise. Thank you.

## 2024-02-19 DIAGNOSIS — I73.00 RAYNAUD'S PHENOMENON WITHOUT GANGRENE: ICD-10-CM

## 2024-02-19 RX ORDER — NIFEDIPINE 30 MG/1
30 TABLET, EXTENDED RELEASE ORAL DAILY
Qty: 30 TABLET | Refills: 5 | Status: SHIPPED | OUTPATIENT
Start: 2024-02-19

## 2024-03-13 LAB
ABSOLUTE BASO #: 0.03 K/UL (ref 0–0.2)
ABSOLUTE EOS #: 0.14 K/UL (ref 0–0.5)
ABSOLUTE LYMPH #: 2.14 K/UL (ref 1–4)
ABSOLUTE MONO #: 0.54 K/UL (ref 0.2–1)
ABSOLUTE NEUT #: 2.72 K/UL (ref 1.5–7.5)
BASOPHILS RELATIVE PERCENT: 0.5 %
EOSINOPHILS RELATIVE PERCENT: 2.5 %
HCT VFR BLD CALC: 36.7 % (ref 34–45)
HEMOGLOBIN: 12.6 G/DL (ref 11.5–15.5)
LYMPHOCYTE %: 38.4 %
MCH RBC QN AUTO: 30.9 PG (ref 25–33)
MCHC RBC AUTO-ENTMCNC: 34.3 G/DL (ref 31–36)
MCV RBC AUTO: 90 FL (ref 80–99)
MONOCYTES # BLD: 9.7 %
NEUTROPHILS RELATIVE PERCENT: 48.7 %
PDW BLD-RTO: 14.3 % (ref 11.5–15)
PLATELETS: 240 K/UL (ref 130–400)
PMV BLD AUTO: 11.4 FL (ref 9.3–13)
RBC: 4.08 M/UL (ref 3.8–5.4)
SEDIMENTATION RATE, ERYTHROCYTE: 13 MM/HR (ref 0–20)
WBC: 5.6 K/UL (ref 3.5–11)

## 2024-03-14 LAB
ALBUMIN SERPL-MCNC: 4.8 G/DL (ref 3.5–5.2)
ALK PHOSPHATASE: 71 U/L (ref 40–136)
ALT SERPL-CCNC: 15 U/L (ref 5–40)
ANION GAP SERPL CALCULATED.3IONS-SCNC: 7 MEQ/L (ref 7–16)
APPEARANCE: CLEAR
AST SERPL-CCNC: 22 U/L (ref 9–40)
BILIRUB SERPL-MCNC: 0.2 MG/DL
BILIRUBIN: NEGATIVE
BUN BLDV-MCNC: 13 MG/DL (ref 6–20)
CALCIUM SERPL-MCNC: 9.6 MG/DL (ref 8.5–10.5)
CHLORIDE BLD-SCNC: 102 MEQ/L (ref 95–107)
CO2: 30 MEQ/L (ref 19–31)
COLOR: YELLOW
CREAT SERPL-MCNC: 0.71 MG/DL (ref 0.6–1.3)
CREATINE, URINE: 40.1 MG/DL
EGFR IF NONAFRICAN AMERICAN: 98 ML/MIN/1.73
GLUCOSE BLD-MCNC: NEGATIVE MG/DL
GLUCOSE: 77 MG/DL (ref 70–99)
KETONES, URINE: NEGATIVE
LEUKOCYTE ESTERASE, URINE: NEGATIVE
NITRITE, URINE: NEGATIVE
OCCULT BLOOD,URINE: NEGATIVE
PH: 7 (ref 5–9)
POTASSIUM SERPL-SCNC: 4.5 MEQ/L (ref 3.5–5.4)
PROTEIN, URINE: 5 MG/DL
PROTEIN, URINE: NEGATIVE
PROTEIN/CREAT RATIO: 125 MG/G
SODIUM BLD-SCNC: 139 MEQ/L (ref 133–146)
SP GRAVITY MISCELLANEOUS: 1.01 (ref 1–1.03)
TOTAL PROTEIN: 7.1 G/DL (ref 6.1–8.3)
UROBILINOGEN, URINE: 0.2

## 2024-03-15 LAB
COMPLEMENT C3: 127 MG/DL (ref 90–180)
COMPLEMENT C4: 27 MG/DL (ref 10–40)

## 2024-03-15 RX ORDER — METHOTREXATE 2.5 MG/1
20 TABLET ORAL WEEKLY
Qty: 32 TABLET | Refills: 1 | Status: SHIPPED | OUTPATIENT
Start: 2024-03-15 | End: 2024-04-14

## 2024-03-19 ENCOUNTER — OFFICE VISIT (OUTPATIENT)
Dept: RHEUMATOLOGY | Age: 59
End: 2024-03-19
Payer: COMMERCIAL

## 2024-03-19 VITALS
HEART RATE: 91 BPM | OXYGEN SATURATION: 99 % | DIASTOLIC BLOOD PRESSURE: 76 MMHG | SYSTOLIC BLOOD PRESSURE: 126 MMHG | BODY MASS INDEX: 22.53 KG/M2 | WEIGHT: 132 LBS | HEIGHT: 64 IN

## 2024-03-19 DIAGNOSIS — Z51.81 MEDICATION MONITORING ENCOUNTER: ICD-10-CM

## 2024-03-19 DIAGNOSIS — M79.7 FIBROMYALGIA: ICD-10-CM

## 2024-03-19 DIAGNOSIS — I73.00 RAYNAUD'S PHENOMENON WITHOUT GANGRENE: ICD-10-CM

## 2024-03-19 DIAGNOSIS — M35.9 CONNECTIVE TISSUE DISEASE (HCC): Primary | ICD-10-CM

## 2024-03-19 PROCEDURE — 99214 OFFICE O/P EST MOD 30 MIN: CPT | Performed by: INTERNAL MEDICINE

## 2024-03-19 PROCEDURE — G8420 CALC BMI NORM PARAMETERS: HCPCS | Performed by: INTERNAL MEDICINE

## 2024-03-19 PROCEDURE — 1036F TOBACCO NON-USER: CPT | Performed by: INTERNAL MEDICINE

## 2024-03-19 PROCEDURE — 3017F COLORECTAL CA SCREEN DOC REV: CPT | Performed by: INTERNAL MEDICINE

## 2024-03-19 PROCEDURE — G8484 FLU IMMUNIZE NO ADMIN: HCPCS | Performed by: INTERNAL MEDICINE

## 2024-03-19 PROCEDURE — G8427 DOCREV CUR MEDS BY ELIG CLIN: HCPCS | Performed by: INTERNAL MEDICINE

## 2024-03-19 RX ORDER — METHOTREXATE 2.5 MG/1
15 TABLET ORAL WEEKLY
Qty: 24 TABLET | Refills: 1 | Status: SHIPPED | OUTPATIENT
Start: 2024-03-19 | End: 2024-04-18

## 2024-03-19 RX ORDER — GABAPENTIN 800 MG/1
800 TABLET ORAL 3 TIMES DAILY
Qty: 90 TABLET | Refills: 2 | Status: SHIPPED | OUTPATIENT
Start: 2024-03-19 | End: 2024-06-17

## 2024-03-19 ASSESSMENT — ENCOUNTER SYMPTOMS
GASTROINTESTINAL NEGATIVE: 1
COLOR CHANGE: 1
RESPIRATORY NEGATIVE: 1

## 2024-03-19 NOTE — PROGRESS NOTES
[facility] RHEUMATOLOGY FOLLOW UP NOTE       Date Of Service: 3/19/2024  Provider: ROSA MAHAN DO, DO  PCP: Sydney Matthews   Name: Olivia Whiteside   MRN: [id number]      Subjective     Chief Complaint   Patient presents with    Follow-up     3 month follow up connective tissue disease         Olivia Whiteside  is a(n)58 y.o. female here for the f/u evaluation of UCTD (? Sjogrens) , raynauds, fibromyalgia, lower back pain.      -- Plaquenil stopped 1 month ago b/c retinal toxicity   -- pain bilateral hands, wrists, right elbow, shoulders , tail bone, bilateral feet.   -- tail bone pain for the past months.   -- aching 4/10 pain in the hands and feet.    Aggravating - weather changes  Hands- increased use, cold exposure.  Feet: wt bearing. Tail bone -sitting   Alleviating: feet - non-wt bearing. Warming. Naproxen    Bubble sensation up and down the legs with initial walking.    Active dry eyes and dry mouth., raynuds (mild)    Holocranial headaches - reported increased use of naproxen 440mg every other day - no vision change , no preceeding symptoms       Depression - continued blue feeling. No suicidal thought, plans at this time   Episode of increased energy lasting a few days but no reported impulse buying or behavior   Persistent anxiety            REVIEW OF SYSTEMS: (ROS)    Review of Systems   Constitutional:  Positive for fatigue.   HENT: Negative.     Eyes: Negative.    Respiratory: Negative.     Cardiovascular: Negative.    Gastrointestinal: Negative.    Endocrine: Negative.    Genitourinary: Negative.    Skin:  Positive for color change.   Neurological:  Positive for numbness.   Hematological: Negative.          PmHx:  has a past medical history of Connective tissue disease (HCC) and Raynaud's phenomenon without gangrene.     Social History:  reports that she has never smoked. She has never used smokeless tobacco. She reports that she does not drink alcohol and does not use

## 2024-05-01 RX ORDER — FOLIC ACID 1 MG/1
2000 TABLET ORAL DAILY
Qty: 60 TABLET | Refills: 3 | Status: SHIPPED | OUTPATIENT
Start: 2024-05-01

## 2024-06-17 LAB
BASOPHILS ABSOLUTE: 0.03 K/UL (ref 0–0.2)
BASOPHILS RELATIVE PERCENT: 0.6 %
EOSINOPHILS ABSOLUTE: 0.26 K/UL (ref 0–0.5)
EOSINOPHILS RELATIVE PERCENT: 4.8 %
HCT VFR BLD CALC: 36.7 % (ref 34–45)
HEMOGLOBIN: 12.4 G/DL (ref 11.5–15.5)
IMMATURE GRANS (ABS): 0.02
IMMATURE GRANULOCYTES %: 0.4 %
LYMPHOCYTES ABSOLUTE: 2.14 K/UL (ref 1–4)
LYMPHOCYTES RELATIVE PERCENT: 39.3 %
MCH RBC QN AUTO: 31.2 PG (ref 25–33)
MCHC RBC AUTO-ENTMCNC: 33.8 G/DL (ref 31–36)
MCV RBC AUTO: 92.2 FL (ref 80–99)
MONOCYTES ABSOLUTE: 0.57 K/UL (ref 0.2–1)
MONOCYTES RELATIVE PERCENT: 10.5 %
NEUTROPHILS ABSOLUTE: 2.42 K/UL (ref 1.5–7.5)
NEUTROPHILS RELATIVE PERCENT: 44.4 %
PDW BLD-RTO: 13.6 % (ref 11.5–15)
PLATELET # BLD: 281 K/UL (ref 130–400)
PMV BLD AUTO: 11.6 FL (ref 9.3–13)
RBC # BLD: 3.98 M/UL (ref 3.8–5.4)
SED RATE, AUTOMATED: 17 MM/HR (ref 0–20)
WBC # BLD: 5.4 K/UL (ref 3.5–11)

## 2024-06-18 ENCOUNTER — OFFICE VISIT (OUTPATIENT)
Dept: RHEUMATOLOGY | Age: 59
End: 2024-06-18
Payer: COMMERCIAL

## 2024-06-18 VITALS
BODY MASS INDEX: 22.57 KG/M2 | HEIGHT: 64 IN | SYSTOLIC BLOOD PRESSURE: 138 MMHG | OXYGEN SATURATION: 98 % | DIASTOLIC BLOOD PRESSURE: 88 MMHG | WEIGHT: 132.2 LBS | HEART RATE: 87 BPM

## 2024-06-18 DIAGNOSIS — M54.42 CHRONIC MIDLINE LOW BACK PAIN WITH BILATERAL SCIATICA: ICD-10-CM

## 2024-06-18 DIAGNOSIS — M35.9 CONNECTIVE TISSUE DISEASE (HCC): Primary | ICD-10-CM

## 2024-06-18 DIAGNOSIS — Z51.81 MEDICATION MONITORING ENCOUNTER: ICD-10-CM

## 2024-06-18 DIAGNOSIS — I73.00 RAYNAUD'S PHENOMENON WITHOUT GANGRENE: ICD-10-CM

## 2024-06-18 DIAGNOSIS — G89.29 CHRONIC MIDLINE LOW BACK PAIN WITH BILATERAL SCIATICA: ICD-10-CM

## 2024-06-18 DIAGNOSIS — M79.7 FIBROMYALGIA: ICD-10-CM

## 2024-06-18 DIAGNOSIS — M54.41 CHRONIC MIDLINE LOW BACK PAIN WITH BILATERAL SCIATICA: ICD-10-CM

## 2024-06-18 LAB
ALBUMIN: 4.3 G/DL (ref 3.5–5.2)
ALK PHOSPHATASE: 76 U/L (ref 40–136)
ALT SERPL-CCNC: 17 U/L (ref 5–40)
ANION GAP SERPL CALCULATED.3IONS-SCNC: 9 MEQ/L (ref 7–16)
APPEARANCE: ABNORMAL
AST SERPL-CCNC: 20 U/L (ref 9–40)
BACTERIA: ABNORMAL PER HPF
BILIRUB SERPL-MCNC: 0.3 MG/DL
BILIRUB SERPL-MCNC: NEGATIVE MG/DL
BUN BLDV-MCNC: 10 MG/DL (ref 6–20)
CALCIUM SERPL-MCNC: 8.9 MG/DL (ref 8.5–10.5)
CHLORIDE BLD-SCNC: 104 MEQ/L (ref 95–107)
CO2: 27 MEQ/L (ref 19–31)
COLOR: ABNORMAL
CREAT SERPL-MCNC: 0.75 MG/DL (ref 0.6–1.3)
CREATINE, URINE: 258.9 MG/DL
EGFR IF NONAFRICAN AMERICAN: 92 ML/MIN/1.73
EPITHELIAL CELLS: ABNORMAL PER HPF (ref 0–10)
GLUCOSE BLD-MCNC: NEGATIVE MG/DL
GLUCOSE: 89 MG/DL (ref 70–99)
HYALINE CASTS: ABNORMAL
KETONES, URINE: ABNORMAL
LEUKOCYTE ESTERASE, URINE: NEGATIVE
NITRITE, URINE: NEGATIVE
OCCULT BLOOD,URINE: NEGATIVE
PH: 7 (ref 5–9)
POTASSIUM SERPL-SCNC: 4.4 MEQ/L (ref 3.5–5.4)
PROTEIN, URINE: 31 MG/DL
PROTEIN, URINE: ABNORMAL
PROTEIN/CREAT RATIO: 120 MG/G
RBC # BLD: ABNORMAL PER HPF (ref 0–5)
SODIUM BLD-SCNC: 140 MEQ/L (ref 133–146)
SP GRAVITY MISCELLANEOUS: 1.02 (ref 1–1.03)
TOTAL PROTEIN: 6.6 G/DL (ref 6.1–8.3)
UROBILINOGEN, URINE: NORMAL
WBC # BLD: ABNORMAL PER HPF (ref 0–5)

## 2024-06-18 PROCEDURE — 3017F COLORECTAL CA SCREEN DOC REV: CPT | Performed by: NURSE PRACTITIONER

## 2024-06-18 PROCEDURE — G8420 CALC BMI NORM PARAMETERS: HCPCS | Performed by: NURSE PRACTITIONER

## 2024-06-18 PROCEDURE — 99214 OFFICE O/P EST MOD 30 MIN: CPT | Performed by: NURSE PRACTITIONER

## 2024-06-18 PROCEDURE — 1036F TOBACCO NON-USER: CPT | Performed by: NURSE PRACTITIONER

## 2024-06-18 PROCEDURE — G8427 DOCREV CUR MEDS BY ELIG CLIN: HCPCS | Performed by: NURSE PRACTITIONER

## 2024-06-18 RX ORDER — NIFEDIPINE 30 MG/1
30 TABLET, EXTENDED RELEASE ORAL DAILY
Qty: 30 TABLET | Refills: 5 | Status: SHIPPED | OUTPATIENT
Start: 2024-06-18

## 2024-06-18 RX ORDER — GABAPENTIN 800 MG/1
800 TABLET ORAL 3 TIMES DAILY
Qty: 90 TABLET | Refills: 2 | Status: SHIPPED | OUTPATIENT
Start: 2024-06-18 | End: 2024-09-16

## 2024-06-18 RX ORDER — VENLAFAXINE HYDROCHLORIDE 150 MG/1
150 CAPSULE, EXTENDED RELEASE ORAL DAILY
COMMUNITY
Start: 2024-05-29

## 2024-06-18 ASSESSMENT — ENCOUNTER SYMPTOMS
EYE ITCHING: 0
SHORTNESS OF BREATH: 0
EYE PAIN: 0
CONSTIPATION: 0
NAUSEA: 0
COUGH: 0
TROUBLE SWALLOWING: 0
DIARRHEA: 0
BACK PAIN: 0
ABDOMINAL PAIN: 0

## 2024-06-18 NOTE — PROGRESS NOTES
Component Value Date    SEDRATE 13 03/13/2024     Lab Results   Component Value Date    CRP <0.3 02/15/2023       RADIOLOGY:         ASSESSMENT/PLAN    Assessment   Plan     Undifferentiated connective tissue disease  - + BRANDON, strong positive SSA, polyarthralgia,  type hand changes, raynauds   - methotrexate 15 mg PO weekly & folic acid 2 mg daily    Headaches- improved w/ decreased methotrexate dose     Depression/anxiety- on effexor 150 mg daily- continued blue feeling. No suicidal thoughts or plans at this time. Has not reach out to PCP yet   - called and left message with PCP per patient request to have them reach out to patient for follow up regarding the depression/anxiety and possibly adjusting medications    Raynauds phenomenon  Prior tx: norvasc 5 mg daily   - procardia 30 mg daily     Fibromyalgia- active   - gabapentin 800 mg TID   - holding on additional medications at this time until she is seen by PCP and anxiety medications are changed/adjusted    Medication monitoring   - CBC, CMP, sed rate, CRP q 8 weeks       No follow-ups on file.        Electronically signed by JOVAN Carr - CNP on 6/18/2024 at 2:32 PM    New Prescriptions    No medications on file       Thank you for allowing me to participate in the care of this patient.   Please call if there are any questions.

## 2024-06-19 LAB
COMPLEMENT C3: 113 MG/DL (ref 90–180)
COMPLEMENT C4: 27 MG/DL (ref 10–40)

## 2024-06-19 RX ORDER — METHOTREXATE 2.5 MG/1
15 TABLET ORAL WEEKLY
Qty: 24 TABLET | Refills: 1 | Status: SHIPPED | OUTPATIENT
Start: 2024-06-19 | End: 2024-07-19

## 2024-07-23 ENCOUNTER — PATIENT MESSAGE (OUTPATIENT)
Dept: RHEUMATOLOGY | Age: 59
End: 2024-07-23

## 2024-07-23 DIAGNOSIS — Z51.81 MEDICATION MONITORING ENCOUNTER: Primary | ICD-10-CM

## 2024-07-23 NOTE — TELEPHONE ENCOUNTER
From: Olivia Whiteside  To: Dr. Shanique Jacome  Sent: 7/23/2024 10:48 AM EDT  Subject: Blood test     Hello, i wasnt sure when i need to get my next blood test . but i need to have call them in too. thank you hermes

## 2024-08-19 ENCOUNTER — TELEPHONE (OUTPATIENT)
Dept: RHEUMATOLOGY | Age: 59
End: 2024-08-19

## 2024-08-20 LAB
BASOPHILS ABSOLUTE: 0.03 K/UL (ref 0–0.2)
BASOPHILS RELATIVE PERCENT: 0.5 %
EOSINOPHILS ABSOLUTE: 0.16 K/UL (ref 0–0.5)
EOSINOPHILS RELATIVE PERCENT: 2.8 %
HCT VFR BLD CALC: 37.6 % (ref 34–45)
HEMOGLOBIN: 12 G/DL (ref 11.5–15.5)
IMMATURE GRANS (ABS): 0.04
IMMATURE GRANULOCYTES %: 0.7 %
LYMPHOCYTES ABSOLUTE: 2.04 K/UL (ref 1–4)
LYMPHOCYTES RELATIVE PERCENT: 36.2 %
MCH RBC QN AUTO: 30.3 PG (ref 25–33)
MCHC RBC AUTO-ENTMCNC: 31.9 G/DL (ref 31–36)
MCV RBC AUTO: 94.9 FL (ref 80–99)
MONOCYTES ABSOLUTE: 0.52 K/UL (ref 0.2–1)
MONOCYTES RELATIVE PERCENT: 9.2 %
NEUTROPHILS ABSOLUTE: 2.85 K/UL (ref 1.5–7.5)
NEUTROPHILS RELATIVE PERCENT: 50.6 %
PDW BLD-RTO: 13.5 % (ref 11.5–15)
PLATELET # BLD: 268 K/UL (ref 130–400)
PMV BLD AUTO: 11.5 FL (ref 9.3–13)
RBC # BLD: 3.96 M/UL (ref 3.8–5.4)
SED RATE, AUTOMATED: 13 MM/HR (ref 0–20)
WBC # BLD: 5.6 K/UL (ref 3.5–11)

## 2024-08-21 LAB
ALBUMIN: 4.4 G/DL (ref 3.5–5.2)
ALK PHOSPHATASE: 88 U/L (ref 40–136)
ALT SERPL-CCNC: 20 U/L (ref 5–40)
ANION GAP SERPL CALCULATED.3IONS-SCNC: 10 MEQ/L (ref 7–16)
AST SERPL-CCNC: 21 U/L (ref 9–40)
BILIRUB SERPL-MCNC: 0.1 MG/DL
BUN BLDV-MCNC: 9 MG/DL (ref 6–20)
C-REACTIVE PROTEIN: 0.4 MG/DL
CALCIUM SERPL-MCNC: 9.5 MG/DL (ref 8.5–10.5)
CHLORIDE BLD-SCNC: 102 MEQ/L (ref 95–107)
CO2: 27 MEQ/L (ref 19–31)
CREAT SERPL-MCNC: 0.74 MG/DL (ref 0.6–1.3)
EGFR IF NONAFRICAN AMERICAN: 94 ML/MIN/1.73
GLUCOSE: 76 MG/DL (ref 70–99)
POTASSIUM SERPL-SCNC: 4.2 MEQ/L (ref 3.5–5.4)
SODIUM BLD-SCNC: 139 MEQ/L (ref 133–146)
TOTAL PROTEIN: 6.9 G/DL (ref 6.1–8.3)

## 2024-08-21 RX ORDER — METHOTREXATE 2.5 MG/1
15 TABLET ORAL WEEKLY
Qty: 24 TABLET | Refills: 1 | Status: SHIPPED | OUTPATIENT
Start: 2024-08-21 | End: 2024-09-20

## 2024-08-29 ENCOUNTER — PATIENT MESSAGE (OUTPATIENT)
Dept: RHEUMATOLOGY | Age: 59
End: 2024-08-29

## 2024-08-29 DIAGNOSIS — M35.9 CONNECTIVE TISSUE DISEASE (HCC): Primary | ICD-10-CM

## 2024-08-29 RX ORDER — PREDNISONE 10 MG/1
TABLET ORAL
Qty: 15 TABLET | Refills: 0 | Status: SHIPPED | OUTPATIENT
Start: 2024-08-29 | End: 2024-09-07

## 2024-08-29 NOTE — TELEPHONE ENCOUNTER
Diagnosis Orders   1. Connective tissue disease (HCC)  predniSONE (DELTASONE) 10 MG tablet

## 2024-09-03 ENCOUNTER — OFFICE VISIT (OUTPATIENT)
Dept: RHEUMATOLOGY | Age: 59
End: 2024-09-03
Payer: COMMERCIAL

## 2024-09-03 VITALS
HEIGHT: 64 IN | DIASTOLIC BLOOD PRESSURE: 74 MMHG | BODY MASS INDEX: 23.32 KG/M2 | WEIGHT: 136.6 LBS | HEART RATE: 81 BPM | OXYGEN SATURATION: 99 % | SYSTOLIC BLOOD PRESSURE: 136 MMHG

## 2024-09-03 DIAGNOSIS — M54.42 CHRONIC BILATERAL LOW BACK PAIN WITH BILATERAL SCIATICA: ICD-10-CM

## 2024-09-03 DIAGNOSIS — M79.7 FIBROMYALGIA: ICD-10-CM

## 2024-09-03 DIAGNOSIS — M54.41 CHRONIC BILATERAL LOW BACK PAIN WITH BILATERAL SCIATICA: ICD-10-CM

## 2024-09-03 DIAGNOSIS — I73.00 RAYNAUD'S PHENOMENON WITHOUT GANGRENE: ICD-10-CM

## 2024-09-03 DIAGNOSIS — M25.551 BILATERAL HIP PAIN: ICD-10-CM

## 2024-09-03 DIAGNOSIS — G89.29 CHRONIC BILATERAL LOW BACK PAIN WITH BILATERAL SCIATICA: ICD-10-CM

## 2024-09-03 DIAGNOSIS — M25.552 BILATERAL HIP PAIN: ICD-10-CM

## 2024-09-03 DIAGNOSIS — M35.9 CONNECTIVE TISSUE DISEASE (HCC): Primary | ICD-10-CM

## 2024-09-03 PROCEDURE — G8427 DOCREV CUR MEDS BY ELIG CLIN: HCPCS | Performed by: NURSE PRACTITIONER

## 2024-09-03 PROCEDURE — G8420 CALC BMI NORM PARAMETERS: HCPCS | Performed by: NURSE PRACTITIONER

## 2024-09-03 PROCEDURE — 3017F COLORECTAL CA SCREEN DOC REV: CPT | Performed by: NURSE PRACTITIONER

## 2024-09-03 PROCEDURE — 99213 OFFICE O/P EST LOW 20 MIN: CPT | Performed by: NURSE PRACTITIONER

## 2024-09-03 PROCEDURE — 1036F TOBACCO NON-USER: CPT | Performed by: NURSE PRACTITIONER

## 2024-09-03 RX ORDER — CARIPRAZINE 3 MG/1
3 CAPSULE, GELATIN COATED ORAL DAILY
COMMUNITY
Start: 2024-08-18

## 2024-09-03 RX ORDER — BUSPIRONE HYDROCHLORIDE 7.5 MG/1
7.5 TABLET ORAL 2 TIMES DAILY
COMMUNITY
Start: 2024-08-25

## 2024-09-03 ASSESSMENT — ENCOUNTER SYMPTOMS
CONSTIPATION: 0
COUGH: 0
TROUBLE SWALLOWING: 0
ABDOMINAL PAIN: 0
DIARRHEA: 0
EYE ITCHING: 0
NAUSEA: 0
SHORTNESS OF BREATH: 0
BACK PAIN: 0
EYE PAIN: 0

## 2024-09-03 NOTE — PROGRESS NOTES
[facility] RHEUMATOLOGY FOLLOW UP NOTE       Date Of Service: 9/3/2024  Provider: Sondra Tam, APRN - CNP    Name: Olivia Whiteside   MRN: [id number]    Chief Complaint(s)     Chief Complaint   Patient presents with    Consultation     Evaluate leg and hip pain         History of Present Illness (HPI)     Olivia Whiteside  is a(n)58 y.o. female with a hx of HTN, HLD, cervicalgia, fibromyalgia, +BRANDON, osteoarthritis, impingement syndrome of left shoulder here for the f/u evaluation of undifferentiated connective tissue disease    Interval hx:    -  bilateral hip pain radiating down into thighs starting about 3 weeks ago. + aching of the thighs   - slightly worsened low back pain as well. Intermittent radicular symptoms down bilateral legs. Worsened paresthesia of lower legs with prolonged walking.    - prednisone started 5 days ago with mild relief     pain affecting the hips, thighs, right knee, fingers, low back   Pain on a scale 0-10: 6/10  Type of pain: constant   Timing: evenings   Aggravating factors: walking   Alleviating factors: rest, aleve, tylenol arthritis (relief)     Associated symptoms:  denies swelling/  Redness/ warmth, + AM stiffness lasting 30 mins        REVIEW OF SYSTEMS: (ROS)    Review of Systems   Constitutional:  Positive for fatigue. Negative for fever and unexpected weight change.   HENT:  Negative for congestion and trouble swallowing.    Eyes:  Negative for pain and itching.        Dry eyes   Respiratory:  Negative for cough and shortness of breath.    Cardiovascular:  Negative for chest pain and leg swelling.   Gastrointestinal:  Negative for abdominal pain, constipation, diarrhea and nausea.   Endocrine: Negative for cold intolerance and heat intolerance.   Genitourinary:  Negative for difficulty urinating, frequency and urgency.   Musculoskeletal:  Positive for arthralgias. Negative for back pain, joint swelling and neck pain.   Skin:  Negative for rash.

## 2024-09-09 ENCOUNTER — TELEPHONE (OUTPATIENT)
Dept: RHEUMATOLOGY | Age: 59
End: 2024-09-09

## 2024-09-09 DIAGNOSIS — M25.551 BILATERAL HIP PAIN: ICD-10-CM

## 2024-09-09 DIAGNOSIS — M54.42 CHRONIC BILATERAL LOW BACK PAIN WITH BILATERAL SCIATICA: ICD-10-CM

## 2024-09-09 DIAGNOSIS — G89.29 CHRONIC BILATERAL LOW BACK PAIN WITH BILATERAL SCIATICA: ICD-10-CM

## 2024-09-09 DIAGNOSIS — M54.41 CHRONIC BILATERAL LOW BACK PAIN WITH BILATERAL SCIATICA: ICD-10-CM

## 2024-09-09 DIAGNOSIS — M25.552 BILATERAL HIP PAIN: ICD-10-CM

## 2024-09-10 ENCOUNTER — HOSPITAL ENCOUNTER (OUTPATIENT)
Dept: GENERAL RADIOLOGY | Age: 59
Discharge: HOME | End: 2024-09-10

## 2024-09-10 DIAGNOSIS — Z00.6 ENCOUNTER FOR EXAMINATION FOR NORMAL COMPARISON OR CONTROL IN CLINICAL RESEARCH PROGRAM: ICD-10-CM

## 2024-09-18 ENCOUNTER — TELEPHONE (OUTPATIENT)
Dept: RHEUMATOLOGY | Age: 59
End: 2024-09-18

## 2024-10-01 ENCOUNTER — TELEPHONE (OUTPATIENT)
Dept: RHEUMATOLOGY | Age: 59
End: 2024-10-01

## 2024-10-01 ENCOUNTER — OFFICE VISIT (OUTPATIENT)
Dept: RHEUMATOLOGY | Age: 59
End: 2024-10-01
Payer: COMMERCIAL

## 2024-10-01 VITALS
HEIGHT: 64 IN | WEIGHT: 139.2 LBS | BODY MASS INDEX: 23.76 KG/M2 | HEART RATE: 92 BPM | OXYGEN SATURATION: 98 % | DIASTOLIC BLOOD PRESSURE: 80 MMHG | SYSTOLIC BLOOD PRESSURE: 132 MMHG

## 2024-10-01 DIAGNOSIS — Z86.59 HISTORY OF CLAUSTROPHOBIA: Primary | ICD-10-CM

## 2024-10-01 DIAGNOSIS — Z79.1 ENCOUNTER FOR MONITORING CHRONIC NSAID THERAPY: ICD-10-CM

## 2024-10-01 DIAGNOSIS — M54.42 CHRONIC BILATERAL LOW BACK PAIN WITH BILATERAL SCIATICA: ICD-10-CM

## 2024-10-01 DIAGNOSIS — Z51.81 MEDICATION MONITORING ENCOUNTER: ICD-10-CM

## 2024-10-01 DIAGNOSIS — M35.9 CONNECTIVE TISSUE DISEASE (HCC): Primary | ICD-10-CM

## 2024-10-01 DIAGNOSIS — M54.41 CHRONIC BILATERAL LOW BACK PAIN WITH BILATERAL SCIATICA: ICD-10-CM

## 2024-10-01 DIAGNOSIS — G89.29 CHRONIC BILATERAL LOW BACK PAIN WITH BILATERAL SCIATICA: ICD-10-CM

## 2024-10-01 DIAGNOSIS — I73.00 RAYNAUD'S PHENOMENON WITHOUT GANGRENE: ICD-10-CM

## 2024-10-01 DIAGNOSIS — M79.7 FIBROMYALGIA: ICD-10-CM

## 2024-10-01 DIAGNOSIS — Z51.81 ENCOUNTER FOR MONITORING CHRONIC NSAID THERAPY: ICD-10-CM

## 2024-10-01 PROCEDURE — 3017F COLORECTAL CA SCREEN DOC REV: CPT | Performed by: INTERNAL MEDICINE

## 2024-10-01 PROCEDURE — 1036F TOBACCO NON-USER: CPT | Performed by: INTERNAL MEDICINE

## 2024-10-01 PROCEDURE — G8484 FLU IMMUNIZE NO ADMIN: HCPCS | Performed by: INTERNAL MEDICINE

## 2024-10-01 PROCEDURE — G8427 DOCREV CUR MEDS BY ELIG CLIN: HCPCS | Performed by: INTERNAL MEDICINE

## 2024-10-01 PROCEDURE — 99214 OFFICE O/P EST MOD 30 MIN: CPT | Performed by: INTERNAL MEDICINE

## 2024-10-01 PROCEDURE — G8420 CALC BMI NORM PARAMETERS: HCPCS | Performed by: INTERNAL MEDICINE

## 2024-10-01 RX ORDER — ALPRAZOLAM 0.25 MG
0.25 TABLET ORAL 3 TIMES DAILY PRN
Qty: 3 TABLET | Refills: 0 | Status: SHIPPED | OUTPATIENT
Start: 2024-10-01 | End: 2024-10-31

## 2024-10-01 RX ORDER — METHOTREXATE 2.5 MG/1
15 TABLET ORAL WEEKLY
COMMUNITY
Start: 2024-09-19

## 2024-10-01 RX ORDER — OMEPRAZOLE 20 MG/1
20 TABLET, DELAYED RELEASE ORAL DAILY
Qty: 30 TABLET | Refills: 3 | Status: SHIPPED | OUTPATIENT
Start: 2024-10-01

## 2024-10-01 RX ORDER — PREDNISONE 10 MG/1
TABLET ORAL
Qty: 15 TABLET | Refills: 0 | Status: SHIPPED | OUTPATIENT
Start: 2024-10-01 | End: 2024-10-10

## 2024-10-01 ASSESSMENT — ENCOUNTER SYMPTOMS
COLOR CHANGE: 1
EYES NEGATIVE: 1
RESPIRATORY NEGATIVE: 1
GASTROINTESTINAL NEGATIVE: 1

## 2024-10-01 NOTE — TELEPHONE ENCOUNTER
Patient is requesting a medication for her claustrophobia when she gets her MRI complete.  Please send over to [facility] in [place].   Please advise. Thank you

## 2024-10-01 NOTE — TELEPHONE ENCOUNTER
LM for patient regarding the script being filled and a  to and from, and also to take one 30 minutes prior to MRI.

## 2024-10-01 NOTE — PROGRESS NOTES
Fibromyalgia- active  Fatigue: worsened   - decrease gabapentin to 400mg q AM , q lung and 800mg at bed time. From 800mg 3 times daily     Long derm NSAID use   Medication monitoring   - CBC, CMP, sed rate, CRP q 8 weeks    - start pirlosec 20mg daily 10/1/24       No follow-ups on file.        Electronically signed by ROSA MAHAN DO on 10/1/2024 at 1:48 PM    New Prescriptions    No medications on file       Thank you for allowing me to participate in the care of this patient.   Please call if there are any questions.

## 2024-10-01 NOTE — TELEPHONE ENCOUNTER
Diagnosis Orders   1. History of claustrophobia  ALPRAZolam (XANAX) 0.25 MG tablet        -- pt should take one tablet 30 minutes prior to the MRI - she should have a  to and from the MRI

## 2024-10-14 ENCOUNTER — HOSPITAL ENCOUNTER (OUTPATIENT)
Dept: MRI IMAGING | Age: 59
Discharge: HOME | End: 2024-10-14
Attending: INTERNAL MEDICINE
Payer: COMMERCIAL

## 2024-10-14 DIAGNOSIS — M54.42 CHRONIC BILATERAL LOW BACK PAIN WITH BILATERAL SCIATICA: ICD-10-CM

## 2024-10-14 DIAGNOSIS — G89.29 CHRONIC BILATERAL LOW BACK PAIN WITH BILATERAL SCIATICA: ICD-10-CM

## 2024-10-14 DIAGNOSIS — M54.41 CHRONIC BILATERAL LOW BACK PAIN WITH BILATERAL SCIATICA: ICD-10-CM

## 2024-10-14 PROCEDURE — 72148 MRI LUMBAR SPINE W/O DYE: CPT

## 2024-10-29 ENCOUNTER — OFFICE VISIT (OUTPATIENT)
Dept: RHEUMATOLOGY | Age: 59
End: 2024-10-29
Payer: COMMERCIAL

## 2024-10-29 VITALS
BODY MASS INDEX: 23.9 KG/M2 | WEIGHT: 140 LBS | HEIGHT: 64 IN | HEART RATE: 94 BPM | OXYGEN SATURATION: 100 % | DIASTOLIC BLOOD PRESSURE: 82 MMHG | SYSTOLIC BLOOD PRESSURE: 144 MMHG

## 2024-10-29 DIAGNOSIS — M25.551 BILATERAL HIP PAIN: ICD-10-CM

## 2024-10-29 DIAGNOSIS — M35.9 CONNECTIVE TISSUE DISEASE (HCC): Primary | ICD-10-CM

## 2024-10-29 DIAGNOSIS — Z51.81 MEDICATION MONITORING ENCOUNTER: ICD-10-CM

## 2024-10-29 DIAGNOSIS — I73.00 RAYNAUD'S PHENOMENON WITHOUT GANGRENE: ICD-10-CM

## 2024-10-29 DIAGNOSIS — G89.29 CHRONIC BILATERAL LOW BACK PAIN WITH BILATERAL SCIATICA: ICD-10-CM

## 2024-10-29 DIAGNOSIS — M25.552 BILATERAL HIP PAIN: ICD-10-CM

## 2024-10-29 DIAGNOSIS — M54.41 CHRONIC BILATERAL LOW BACK PAIN WITH BILATERAL SCIATICA: ICD-10-CM

## 2024-10-29 DIAGNOSIS — M79.7 FIBROMYALGIA: ICD-10-CM

## 2024-10-29 DIAGNOSIS — M54.42 CHRONIC BILATERAL LOW BACK PAIN WITH BILATERAL SCIATICA: ICD-10-CM

## 2024-10-29 PROCEDURE — 99214 OFFICE O/P EST MOD 30 MIN: CPT | Performed by: NURSE PRACTITIONER

## 2024-10-29 PROCEDURE — 1036F TOBACCO NON-USER: CPT | Performed by: NURSE PRACTITIONER

## 2024-10-29 PROCEDURE — G8484 FLU IMMUNIZE NO ADMIN: HCPCS | Performed by: NURSE PRACTITIONER

## 2024-10-29 PROCEDURE — G8420 CALC BMI NORM PARAMETERS: HCPCS | Performed by: NURSE PRACTITIONER

## 2024-10-29 PROCEDURE — G8427 DOCREV CUR MEDS BY ELIG CLIN: HCPCS | Performed by: NURSE PRACTITIONER

## 2024-10-29 PROCEDURE — 3017F COLORECTAL CA SCREEN DOC REV: CPT | Performed by: NURSE PRACTITIONER

## 2024-10-29 RX ORDER — LEFLUNOMIDE 10 MG/1
10 TABLET ORAL DAILY
Qty: 30 TABLET | Refills: 0 | Status: SHIPPED | OUTPATIENT
Start: 2024-10-29

## 2024-10-29 ASSESSMENT — ENCOUNTER SYMPTOMS
BACK PAIN: 0
NAUSEA: 0
CONSTIPATION: 0
COUGH: 0
SHORTNESS OF BREATH: 0
EYE ITCHING: 0
EYE PAIN: 0
TROUBLE SWALLOWING: 0
DIARRHEA: 0
ABDOMINAL PAIN: 0

## 2024-10-29 NOTE — PROGRESS NOTES
[facility] RHEUMATOLOGY FOLLOW UP NOTE       Date Of Service: 10/29/2024  Provider: Sondra Tam, APRN - CNP    Name: Olivia Whiteside   MRN: [id number]    Chief Complaint(s)     Chief Complaint   Patient presents with    Follow-up     Connective tissue disease, MRI results, discuss medications  She is having pain in her hands, elbows, and left foot toes. Her pain is a 5.        History of Present Illness (HPI)     Olivia Whiteside  is a(n)59 y.o. female with a hx of HTN, HLD, cervicalgia, fibromyalgia, +BRANDON, osteoarthritis, impingement syndrome of left shoulder here for the f/u evaluation of undifferentiated connective tissue disease    Interval hx:    -  would like to discuss MRI lumbar spine and treatment options    pain affecting the fingers, back, hips, knees, feet  Pain on a scale 0-10: 5/10  Type of pain: intermittent  Timing: afternoon and evening  Aggravating factors: increased use, activity, cold exposure  Alleviating factors: naproxen, gabapentin    Associated symptoms:  + swelling/  Redness/ warmth (fingers) , + AM stiffness lasting 30 mins       REVIEW OF SYSTEMS: (ROS)    Review of Systems   Constitutional:  Positive for fatigue. Negative for fever and unexpected weight change.   HENT:  Negative for congestion and trouble swallowing.    Eyes:  Negative for pain and itching.        Dry eyes   Respiratory:  Negative for cough and shortness of breath.    Cardiovascular:  Negative for chest pain and leg swelling.   Gastrointestinal:  Negative for abdominal pain, constipation, diarrhea and nausea.   Endocrine: Negative for cold intolerance and heat intolerance.   Genitourinary:  Negative for difficulty urinating, frequency and urgency.   Musculoskeletal:  Positive for arthralgias and joint swelling. Negative for back pain and neck pain.   Skin:  Negative for rash.   Neurological:  Negative for dizziness, weakness, numbness and headaches.       PAST MEDICAL HISTORY      Past Medical History:

## 2024-11-05 RX ORDER — GABAPENTIN 800 MG/1
800 TABLET ORAL 3 TIMES DAILY
Qty: 90 TABLET | Refills: 2 | Status: SHIPPED | OUTPATIENT
Start: 2024-11-05 | End: 2025-02-03

## 2024-11-20 DIAGNOSIS — M35.9 CONNECTIVE TISSUE DISEASE (HCC): Primary | ICD-10-CM

## 2024-11-20 LAB
ALBUMIN: 4.5 G/DL (ref 3.5–5.2)
ALK PHOSPHATASE: 82 U/L (ref 30–121)
ALT SERPL-CCNC: 15 U/L (ref 5–33)
ANION GAP SERPL CALCULATED.3IONS-SCNC: 11 MMOL/L (ref 7–16)
AST SERPL-CCNC: 18 U/L (ref 9–40)
BASOPHILS ABSOLUTE: 0.01 K/UL (ref 0–0.2)
BASOPHILS RELATIVE PERCENT: 0.2 % (ref 0–2)
BILIRUB SERPL-MCNC: 0.2 MG/DL
BUN BLDV-MCNC: 11 MG/DL (ref 6–20)
C-REACTIVE PROTEIN: 0.4 MG/DL
CALCIUM SERPL-MCNC: 9.1 MG/DL (ref 8.6–10.5)
CHLORIDE BLD-SCNC: 103 MMOL/L (ref 96–107)
CO2: 26 MMOL/L (ref 18–32)
CREAT SERPL-MCNC: 0.74 MG/DL (ref 0.51–1.15)
EGFR IF NONAFRICAN AMERICAN: 93 ML/MIN/1.73M2
EOSINOPHILS ABSOLUTE: 0.14 K/UL (ref 0–0.8)
EOSINOPHILS RELATIVE PERCENT: 2.3 % (ref 0–5)
GLUCOSE: 84 MG/DL (ref 65–125)
HCT VFR BLD CALC: 38.7 % (ref 35–47)
HEMOGLOBIN: 12.5 G/DL (ref 11.9–16)
IMMATURE GRANS (ABS): 0.02 K/UL (ref 0–0.06)
IMMATURE GRANULOCYTES %: 0.3 % (ref 0–2)
LYMPHOCYTES ABSOLUTE: 1.79 K/UL (ref 0.9–5.2)
LYMPHOCYTES RELATIVE PERCENT: 28.9 % (ref 20–45)
MCH RBC QN AUTO: 31 PG (ref 26–33)
MCHC RBC AUTO-ENTMCNC: 32.3 G/DL (ref 32–35)
MCV RBC AUTO: 96 FL (ref 75–100)
MONOCYTES ABSOLUTE: 0.77 K/UL (ref 0.1–1)
MONOCYTES RELATIVE PERCENT: 12.4 % (ref 0–13)
NEUTROPHILS ABSOLUTE: 3.47 K/UL (ref 1.9–8)
NEUTROPHILS RELATIVE PERCENT: 55.9 % (ref 45–75)
PDW BLD-RTO: 13.8 % (ref 11.2–14.8)
PLATELET # BLD: 257 THOUS/CMM (ref 140–440)
POTASSIUM SERPL-SCNC: 4.6 MMOL/L (ref 3.5–5.4)
RBC # BLD: 4.03 MILL/CMM (ref 3.8–5.2)
SED RATE, AUTOMATED: 9 MM/HR (ref 0–29)
SODIUM BLD-SCNC: 140 MMOL/L (ref 135–148)
TOTAL PROTEIN: 7 G/DL (ref 6–8.3)
WBC # BLD: 6.2 THDS/CMM (ref 3.6–11)

## 2024-11-20 RX ORDER — METHOTREXATE 2.5 MG/1
15 TABLET ORAL WEEKLY
Qty: 48 TABLET | Refills: 0 | Status: SHIPPED | OUTPATIENT
Start: 2024-11-20

## 2024-11-20 RX ORDER — LEFLUNOMIDE 10 MG/1
10 TABLET ORAL DAILY
Qty: 30 TABLET | Refills: 0 | Status: SHIPPED | OUTPATIENT
Start: 2024-11-20

## 2024-11-20 RX ORDER — METHOTREXATE 2.5 MG/1
15 TABLET ORAL WEEKLY
Qty: 24 TABLET | Refills: 0 | OUTPATIENT
Start: 2024-11-20

## 2024-11-21 ENCOUNTER — PATIENT MESSAGE (OUTPATIENT)
Dept: RHEUMATOLOGY | Age: 59
End: 2024-11-21

## 2024-12-03 ENCOUNTER — OFFICE VISIT (OUTPATIENT)
Dept: RHEUMATOLOGY | Age: 59
End: 2024-12-03
Payer: COMMERCIAL

## 2024-12-03 VITALS
BODY MASS INDEX: 23.32 KG/M2 | WEIGHT: 136.6 LBS | SYSTOLIC BLOOD PRESSURE: 118 MMHG | HEIGHT: 64 IN | HEART RATE: 86 BPM | OXYGEN SATURATION: 99 % | DIASTOLIC BLOOD PRESSURE: 62 MMHG

## 2024-12-03 DIAGNOSIS — M54.42 CHRONIC BILATERAL LOW BACK PAIN WITH BILATERAL SCIATICA: ICD-10-CM

## 2024-12-03 DIAGNOSIS — M54.41 CHRONIC BILATERAL LOW BACK PAIN WITH BILATERAL SCIATICA: ICD-10-CM

## 2024-12-03 DIAGNOSIS — M35.9 CONNECTIVE TISSUE DISEASE (HCC): ICD-10-CM

## 2024-12-03 DIAGNOSIS — I73.00 RAYNAUD'S PHENOMENON WITHOUT GANGRENE: Primary | ICD-10-CM

## 2024-12-03 DIAGNOSIS — M79.7 FIBROMYALGIA: ICD-10-CM

## 2024-12-03 DIAGNOSIS — Z51.81 MEDICATION MONITORING ENCOUNTER: ICD-10-CM

## 2024-12-03 DIAGNOSIS — G89.29 CHRONIC BILATERAL LOW BACK PAIN WITH BILATERAL SCIATICA: ICD-10-CM

## 2024-12-03 PROCEDURE — 1036F TOBACCO NON-USER: CPT | Performed by: NURSE PRACTITIONER

## 2024-12-03 PROCEDURE — 3017F COLORECTAL CA SCREEN DOC REV: CPT | Performed by: NURSE PRACTITIONER

## 2024-12-03 PROCEDURE — G8484 FLU IMMUNIZE NO ADMIN: HCPCS | Performed by: NURSE PRACTITIONER

## 2024-12-03 PROCEDURE — G8427 DOCREV CUR MEDS BY ELIG CLIN: HCPCS | Performed by: NURSE PRACTITIONER

## 2024-12-03 PROCEDURE — G8420 CALC BMI NORM PARAMETERS: HCPCS | Performed by: NURSE PRACTITIONER

## 2024-12-03 PROCEDURE — 99214 OFFICE O/P EST MOD 30 MIN: CPT | Performed by: NURSE PRACTITIONER

## 2024-12-03 RX ORDER — LEFLUNOMIDE 20 MG/1
20 TABLET ORAL DAILY
Qty: 30 TABLET | Refills: 0 | Status: SHIPPED | OUTPATIENT
Start: 2024-12-03

## 2024-12-03 RX ORDER — CARIPRAZINE 4.5 MG/1
4.5 CAPSULE, GELATIN COATED ORAL DAILY
COMMUNITY
Start: 2024-11-24

## 2024-12-03 ASSESSMENT — ENCOUNTER SYMPTOMS
CONSTIPATION: 0
ABDOMINAL PAIN: 0
EYE ITCHING: 0
DIARRHEA: 0
BACK PAIN: 0
EYE PAIN: 0
SHORTNESS OF BREATH: 0
COUGH: 0
TROUBLE SWALLOWING: 0
NAUSEA: 0

## 2024-12-03 NOTE — PROGRESS NOTES
[facility] RHEUMATOLOGY FOLLOW UP NOTE       Date Of Service: 12/3/2024  Provider: Sondra Tam, APRN - CNP    Name: Olivia Whiteside   MRN: [id number]    Chief Complaint(s)     Chief Complaint   Patient presents with    Follow-up     2-month f/u for connective tissue disease.         History of Present Illness (HPI)     Olivia Whiteside  is a(n)59 y.o. female with a hx of HTN, HLD, cervicalgia, fibromyalgia, +BRANDON, osteoarthritis, impingement syndrome of left shoulder here for the f/u evaluation of undifferentiated connective tissue disease    Interval hx:    - does feels some improvement with the arava     pain affecting the fingers, hips, knees, feet, left shoulder   Pain on a scale 0-10: 5.5/10  Type of pain: intermittent  Timing: afternoon and evening  Aggravating factors: increased use, activity, cold exposure  Alleviating factors: naproxen, gabapentin    Associated symptoms:  denies swelling/  Redness/ warmth  , + AM stiffness lasting 30 mins        REVIEW OF SYSTEMS: (ROS)    Review of Systems   Constitutional:  Positive for fatigue. Negative for fever and unexpected weight change.   HENT:  Negative for congestion and trouble swallowing.    Eyes:  Negative for pain and itching.        Dry eyes   Respiratory:  Negative for cough and shortness of breath.    Cardiovascular:  Negative for chest pain and leg swelling.   Gastrointestinal:  Negative for abdominal pain, constipation, diarrhea and nausea.   Endocrine: Negative for cold intolerance and heat intolerance.   Genitourinary:  Negative for difficulty urinating, frequency and urgency.   Musculoskeletal:  Positive for arthralgias. Negative for back pain, joint swelling and neck pain.   Skin:  Negative for rash.   Neurological:  Negative for dizziness, weakness, numbness and headaches.       PAST MEDICAL HISTORY      Past Medical History:   Diagnosis Date    Connective tissue disease (HCC) 1/5/2022    Raynaud's phenomenon without gangrene

## 2024-12-10 ENCOUNTER — TELEPHONE (OUTPATIENT)
Dept: RHEUMATOLOGY | Age: 59
End: 2024-12-10

## 2024-12-10 NOTE — TELEPHONE ENCOUNTER
Patient notified and verbalized understanding.       Where is pain located?  Back, whole body  When did the pain start?  Couple weeks ago  Rate the pain 1-10. Ten being the worst  4  Describe the pain.  (Dull, Aching, Stabbing, radiating, etc)  dull  Has this pain occurred in the past?  yes  What have you used to alleviate this pain?  Tylenol usually helps, heat  What aggravates it?  Being at work  Joint swelling or redness?  No swelling; no redness.     Patient asked what she should do for the pain for now. Asked patient if she was wanting to try steroid taper. Patient stated she will just stick with the tylenol for now and call office back if the tylenol does not help any longer.

## 2024-12-10 NOTE — TELEPHONE ENCOUNTER
Patient called stating she is having problems with the leflunomide. She is feeling like her body is wanting to move faster than what it is, like she is lagging. She is having hot flashes. She just \"plain don't feel good.\" She started feeling like this a couple weeks ago. She feels worse since the leflunomide was increased. Everyone keeps telling her she looks like a zombie. She does not feel good. Her whole body just hurts. It hurts to take a shower.     Her Vraylar was also increased about a month prior to seeing our office. She is not sure if her symptoms are from the Vraylar or the leflunomide. Please advise. Thank you.

## 2024-12-10 NOTE — TELEPHONE ENCOUNTER
We can hold the leflunomide and see if the symptoms improve. If no improvement, would like for her to reach out to whomever is prescribing the Vraylar because it could possibly be due to that medication.

## 2025-01-11 DIAGNOSIS — M35.9 CONNECTIVE TISSUE DISEASE (HCC): ICD-10-CM

## 2025-01-13 ENCOUNTER — TELEPHONE (OUTPATIENT)
Age: 60
End: 2025-01-13

## 2025-01-13 RX ORDER — METHOTREXATE 2.5 MG/1
TABLET ORAL
Qty: 48 TABLET | Refills: 0 | OUTPATIENT
Start: 2025-01-13

## 2025-01-13 NOTE — TELEPHONE ENCOUNTER
The Pt called to N[facility] messina know she stopped taking Leflunomiede approximately 30 days ago & she is doing fine.

## 2025-01-14 DIAGNOSIS — M35.9 CONNECTIVE TISSUE DISEASE (HCC): ICD-10-CM

## 2025-01-14 DIAGNOSIS — I73.00 RAYNAUD'S PHENOMENON WITHOUT GANGRENE: ICD-10-CM

## 2025-01-14 LAB
ALBUMIN: 4.6 G/DL (ref 3.5–5.2)
ALK PHOSPHATASE: 63 U/L (ref 30–121)
ALT SERPL-CCNC: 22 U/L (ref 5–33)
ANION GAP SERPL CALCULATED.3IONS-SCNC: 11 MMOL/L (ref 7–16)
AST SERPL-CCNC: 22 U/L (ref 9–40)
BASOPHILS ABSOLUTE: 0.02 K/UL (ref 0–0.2)
BASOPHILS RELATIVE PERCENT: 0.4 % (ref 0–2)
BILIRUB SERPL-MCNC: 0.2 MG/DL
BUN BLDV-MCNC: 8 MG/DL (ref 6–20)
C-REACTIVE PROTEIN: 0.2 MG/DL
CALCIUM SERPL-MCNC: 9.5 MG/DL (ref 8.6–10.5)
CHLORIDE BLD-SCNC: 105 MMOL/L (ref 96–107)
CO2: 26 MMOL/L (ref 18–32)
CREAT SERPL-MCNC: 0.66 MG/DL (ref 0.51–1.15)
EGFR IF NONAFRICAN AMERICAN: 101 ML/MIN/1.73M2
EOSINOPHILS ABSOLUTE: 0.07 K/UL (ref 0–0.8)
EOSINOPHILS RELATIVE PERCENT: 1.4 % (ref 0–5)
GLUCOSE: 84 MG/DL (ref 65–125)
HCT VFR BLD CALC: 38.4 % (ref 35–47)
HEMOGLOBIN: 12.8 G/DL (ref 11.9–16)
IMMATURE GRANS (ABS): 0.02 K/UL (ref 0–0.06)
IMMATURE GRANULOCYTES %: 0.4 % (ref 0–2)
LYMPHOCYTES ABSOLUTE: 1.52 K/UL (ref 0.9–5.2)
LYMPHOCYTES RELATIVE PERCENT: 31 % (ref 20–45)
MCH RBC QN AUTO: 31.5 PG (ref 26–33)
MCHC RBC AUTO-ENTMCNC: 33.3 G/DL (ref 32–35)
MCV RBC AUTO: 95 FL (ref 75–100)
MONOCYTES ABSOLUTE: 0.69 K/UL (ref 0.1–1)
MONOCYTES RELATIVE PERCENT: 14.1 % (ref 0–13)
NEUTROPHILS ABSOLUTE: 2.58 K/UL (ref 1.9–8)
NEUTROPHILS RELATIVE PERCENT: 52.7 % (ref 45–75)
PDW BLD-RTO: 13.8 % (ref 11.2–14.8)
PLATELET # BLD: 292 THOUS/CMM (ref 140–440)
POTASSIUM SERPL-SCNC: 4.3 MMOL/L (ref 3.5–5.4)
RBC # BLD: 4.06 MILL/CMM (ref 3.8–5.2)
SED RATE, AUTOMATED: 11 MM/HR (ref 0–29)
SODIUM BLD-SCNC: 142 MMOL/L (ref 135–148)
TOTAL PROTEIN: 7 G/DL (ref 6–8.3)
WBC # BLD: 4.9 THDS/CMM (ref 3.6–11)

## 2025-01-14 RX ORDER — LEFLUNOMIDE 20 MG/1
20 TABLET ORAL DAILY
Qty: 30 TABLET | Refills: 1 | Status: SHIPPED | OUTPATIENT
Start: 2025-01-14 | End: 2025-01-16

## 2025-01-14 RX ORDER — NIFEDIPINE 30 MG/1
30 TABLET, EXTENDED RELEASE ORAL DAILY
Qty: 30 TABLET | Refills: 0 | Status: SHIPPED | OUTPATIENT
Start: 2025-01-14

## 2025-01-14 RX ORDER — METHOTREXATE 2.5 MG/1
15 TABLET ORAL WEEKLY
Qty: 48 TABLET | Refills: 0 | Status: SHIPPED | OUTPATIENT
Start: 2025-01-14

## 2025-02-03 RX ORDER — GABAPENTIN 800 MG/1
800 TABLET ORAL 3 TIMES DAILY
Qty: 90 TABLET | Refills: 2 | Status: SHIPPED | OUTPATIENT
Start: 2025-02-03 | End: 2025-05-04

## 2025-02-11 DIAGNOSIS — I73.00 RAYNAUD'S PHENOMENON WITHOUT GANGRENE: ICD-10-CM

## 2025-02-11 RX ORDER — NIFEDIPINE 30 MG/1
30 TABLET, EXTENDED RELEASE ORAL DAILY
Qty: 30 TABLET | Refills: 3 | Status: SHIPPED | OUTPATIENT
Start: 2025-02-11

## 2025-02-18 ENCOUNTER — TELEPHONE (OUTPATIENT)
Age: 60
End: 2025-02-18

## 2025-02-18 ENCOUNTER — OFFICE VISIT (OUTPATIENT)
Age: 60
End: 2025-02-18
Payer: COMMERCIAL

## 2025-02-18 VITALS
HEIGHT: 64 IN | HEART RATE: 81 BPM | DIASTOLIC BLOOD PRESSURE: 80 MMHG | WEIGHT: 134.3 LBS | BODY MASS INDEX: 22.93 KG/M2 | OXYGEN SATURATION: 99 % | SYSTOLIC BLOOD PRESSURE: 130 MMHG

## 2025-02-18 DIAGNOSIS — M54.42 CHRONIC BILATERAL LOW BACK PAIN WITH BILATERAL SCIATICA: ICD-10-CM

## 2025-02-18 DIAGNOSIS — M54.41 CHRONIC BILATERAL LOW BACK PAIN WITH BILATERAL SCIATICA: ICD-10-CM

## 2025-02-18 DIAGNOSIS — Z79.1 ENCOUNTER FOR MONITORING CHRONIC NSAID THERAPY: ICD-10-CM

## 2025-02-18 DIAGNOSIS — M35.9 CONNECTIVE TISSUE DISEASE: ICD-10-CM

## 2025-02-18 DIAGNOSIS — G89.29 CHRONIC BILATERAL LOW BACK PAIN WITH BILATERAL SCIATICA: ICD-10-CM

## 2025-02-18 DIAGNOSIS — Z51.81 ENCOUNTER FOR MONITORING CHRONIC NSAID THERAPY: ICD-10-CM

## 2025-02-18 PROCEDURE — G8427 DOCREV CUR MEDS BY ELIG CLIN: HCPCS | Performed by: INTERNAL MEDICINE

## 2025-02-18 PROCEDURE — 1036F TOBACCO NON-USER: CPT | Performed by: INTERNAL MEDICINE

## 2025-02-18 PROCEDURE — 99214 OFFICE O/P EST MOD 30 MIN: CPT | Performed by: INTERNAL MEDICINE

## 2025-02-18 PROCEDURE — G8420 CALC BMI NORM PARAMETERS: HCPCS | Performed by: INTERNAL MEDICINE

## 2025-02-18 PROCEDURE — 3017F COLORECTAL CA SCREEN DOC REV: CPT | Performed by: INTERNAL MEDICINE

## 2025-02-18 RX ORDER — NAPROXEN 375 MG/1
375 TABLET ORAL 2 TIMES DAILY PRN
Qty: 180 TABLET | Refills: 1 | Status: SHIPPED | OUTPATIENT
Start: 2025-02-18

## 2025-02-18 RX ORDER — OMEPRAZOLE 20 MG/1
20 TABLET, DELAYED RELEASE ORAL DAILY
Qty: 30 TABLET | Refills: 3 | Status: SHIPPED | OUTPATIENT
Start: 2025-02-18

## 2025-02-18 RX ORDER — METHOTREXATE 2.5 MG/1
10 TABLET ORAL WEEKLY
Qty: 48 TABLET | Refills: 0 | Status: SHIPPED | OUTPATIENT
Start: 2025-02-18

## 2025-02-18 ASSESSMENT — JOINT PAIN
TOTAL NUMBER OF TENDER JOINTS: 2
TOTAL NUMBER OF SWOLLEN JOINTS: 0

## 2025-02-18 ASSESSMENT — ENCOUNTER SYMPTOMS
BACK PAIN: 0
TROUBLE SWALLOWING: 0
EYE ITCHING: 0
DIARRHEA: 0
CONSTIPATION: 0
SHORTNESS OF BREATH: 0
ABDOMINAL PAIN: 0
COUGH: 0
EYE PAIN: 0
NAUSEA: 0

## 2025-02-18 NOTE — TELEPHONE ENCOUNTER
Received call from [facility] pharmacy stating interaction between methotrexate and naproxen causing increased concentration of the methotrexate. [facility] asking if ok to proceed with naproxen refill. Please advise. Thank you.        [phone]

## 2025-02-18 NOTE — PROGRESS NOTES
[facility] RHEUMATOLOGY FOLLOW UP NOTE       Date Of Service: 2/18/2025  Provider: ROSA MAHAN DO    Name: Olivia Whiteside   MRN: [id number]    Chief Complaint(s)     Chief Complaint   Patient presents with    Follow-up     4-month f/u for Connective tissue disease.        History of Present Illness (HPI)     Olivia Whiteside  is a(n)59 y.o. female with a hx of HTN, HLD, cervicalgia, fibromyalgia, +BRANDON, osteoarthritis, impingement syndrome of left shoulder here for the f/u evaluation of undifferentiated connective tissue disease    Interval hx:       Off the arava - reported increased muscle weakness and joint stiffness.   Pain involving the hands, knees, and lower back - pain up to 3/10 over the past week. - Timing: afternoon and evening  Aggravating factors: increased use, activity, cold exposure  Alleviating factors: naproxen, gabapentin    Associated symptoms:  denies swelling/  Redness/ warmth  , + AM stiffness lasting 30 mins        REVIEW OF SYSTEMS: (ROS)    Review of Systems   Constitutional:  Positive for fatigue. Negative for fever and unexpected weight change.   HENT:  Negative for congestion and trouble swallowing.    Eyes:  Negative for pain and itching.        Dry eyes   Respiratory:  Negative for cough and shortness of breath.    Cardiovascular:  Negative for chest pain and leg swelling.   Gastrointestinal:  Negative for abdominal pain, constipation, diarrhea and nausea.   Endocrine: Negative for cold intolerance and heat intolerance.   Genitourinary:  Negative for difficulty urinating, frequency and urgency.   Musculoskeletal:  Positive for arthralgias. Negative for back pain, joint swelling and neck pain.   Skin:  Negative for rash.   Neurological:  Negative for dizziness, weakness, numbness and headaches.     PmHx:  has a past medical history of Connective tissue disease (HCC) and Raynaud's phenomenon without gangrene.     Social History:  reports that she has never smoked. She has

## 2025-04-02 ENCOUNTER — RESULTS FOLLOW-UP (OUTPATIENT)
Age: 60
End: 2025-04-02

## 2025-04-02 DIAGNOSIS — M35.9 CONNECTIVE TISSUE DISEASE: ICD-10-CM

## 2025-04-02 LAB
ALBUMIN: 4.3 G/DL (ref 3.5–5.2)
ALK PHOSPHATASE: 83 U/L (ref 30–121)
ALT SERPL-CCNC: 15 U/L (ref 5–33)
ANION GAP SERPL CALCULATED.3IONS-SCNC: 13 MMOL/L (ref 7–16)
AST SERPL-CCNC: 17 U/L (ref 9–40)
BASOPHILS ABSOLUTE: 0.02 K/UL (ref 0–0.2)
BASOPHILS RELATIVE PERCENT: 0.4 % (ref 0–2)
BILIRUB SERPL-MCNC: 0.2 MG/DL
BUN BLDV-MCNC: 11 MG/DL (ref 6–20)
C-REACTIVE PROTEIN: 0.3 MG/DL
CALCIUM SERPL-MCNC: 9.5 MG/DL (ref 8.6–10.5)
CHLORIDE BLD-SCNC: 105 MMOL/L (ref 96–107)
CO2: 23 MMOL/L (ref 18–32)
CREAT SERPL-MCNC: 0.61 MG/DL (ref 0.51–1.15)
EGFR IF NONAFRICAN AMERICAN: 103 ML/MIN/1.73M2
EOSINOPHILS ABSOLUTE: 0.14 K/UL (ref 0–0.8)
EOSINOPHILS RELATIVE PERCENT: 2.7 % (ref 0–5)
GLUCOSE: 120 MG/DL (ref 65–125)
HCT VFR BLD CALC: 36 % (ref 35–47)
HEMOGLOBIN: 12 G/DL (ref 11.9–16)
IMMATURE GRANS (ABS): 0.04 K/UL (ref 0–0.06)
IMMATURE GRANULOCYTES %: 0.8 % (ref 0–2)
LYMPHOCYTES ABSOLUTE: 2.18 K/UL (ref 0.9–5.2)
LYMPHOCYTES RELATIVE PERCENT: 42.1 % (ref 20–45)
MCH RBC QN AUTO: 31.7 PG (ref 26–33)
MCHC RBC AUTO-ENTMCNC: 33.3 G/DL (ref 32–35)
MCV RBC AUTO: 95 FL (ref 75–100)
MONOCYTES ABSOLUTE: 0.46 K/UL (ref 0.1–1)
MONOCYTES RELATIVE PERCENT: 8.9 % (ref 0–13)
NEUTROPHILS ABSOLUTE: 2.34 K/UL (ref 1.9–8)
NEUTROPHILS RELATIVE PERCENT: 45.1 % (ref 45–75)
PDW BLD-RTO: 13.5 % (ref 11.2–14.8)
PLATELET # BLD: 304 THOUS/CMM (ref 140–440)
POTASSIUM SERPL-SCNC: 4.2 MMOL/L (ref 3.5–5.4)
RBC # BLD: 3.79 MILL/CMM (ref 3.8–5.2)
SED RATE, AUTOMATED: 11 MM/HR (ref 0–29)
SODIUM BLD-SCNC: 141 MMOL/L (ref 135–148)
TOTAL PROTEIN: 6.8 G/DL (ref 6–8.3)
WBC # BLD: 5.2 THDS/CMM (ref 3.6–11)

## 2025-04-02 RX ORDER — METHOTREXATE 2.5 MG/1
10 TABLET ORAL WEEKLY
Qty: 48 TABLET | Refills: 0 | Status: SHIPPED | OUTPATIENT
Start: 2025-04-02 | End: 2025-05-14 | Stop reason: SDUPTHER

## 2025-04-09 ENCOUNTER — TRANSCRIBE ORDERS (OUTPATIENT)
Dept: ADMINISTRATIVE | Age: 60
End: 2025-04-09

## 2025-04-09 DIAGNOSIS — Z12.31 ENCOUNTER FOR SCREENING MAMMOGRAM FOR MALIGNANT NEOPLASM OF BREAST: Primary | ICD-10-CM

## 2025-05-06 ENCOUNTER — HOSPITAL ENCOUNTER (OUTPATIENT)
Dept: MAMMOGRAPHY | Age: 60
Discharge: HOME | End: 2025-05-06
Payer: COMMERCIAL

## 2025-05-06 DIAGNOSIS — Z12.31 ENCOUNTER FOR SCREENING MAMMOGRAM FOR MALIGNANT NEOPLASM OF BREAST: ICD-10-CM

## 2025-05-06 DIAGNOSIS — Z12.31 VISIT FOR SCREENING MAMMOGRAM: ICD-10-CM

## 2025-05-06 PROCEDURE — 77063 BREAST TOMOSYNTHESIS BI: CPT

## 2025-05-14 ENCOUNTER — RESULTS FOLLOW-UP (OUTPATIENT)
Age: 60
End: 2025-05-14

## 2025-05-14 DIAGNOSIS — M35.9 CONNECTIVE TISSUE DISEASE: ICD-10-CM

## 2025-05-14 LAB
ALBUMIN: 4.1 G/DL (ref 3.5–5.2)
ALK PHOSPHATASE: 79 U/L (ref 30–121)
ALT SERPL-CCNC: 16 U/L (ref 5–33)
ANION GAP SERPL CALCULATED.3IONS-SCNC: 14 MMOL/L (ref 7–16)
AST SERPL-CCNC: 19 U/L (ref 9–40)
BASOPHILS ABSOLUTE: 0.04 K/UL (ref 0–0.2)
BASOPHILS RELATIVE PERCENT: 0.8 % (ref 0–2)
BILIRUB SERPL-MCNC: 0.2 MG/DL
BUN BLDV-MCNC: 7 MG/DL (ref 6–20)
C-REACTIVE PROTEIN: 0.5 MG/DL
CALCIUM SERPL-MCNC: 9.5 MG/DL (ref 8.6–10.5)
CHLORIDE BLD-SCNC: 102 MMOL/L (ref 96–107)
CO2: 26 MMOL/L (ref 18–32)
CREAT SERPL-MCNC: 0.67 MG/DL (ref 0.51–1.15)
EGFR IF NONAFRICAN AMERICAN: 101 ML/MIN/1.73M2
EOSINOPHILS ABSOLUTE: 0.13 K/UL (ref 0–0.8)
EOSINOPHILS RELATIVE PERCENT: 2.6 % (ref 0–5)
GLUCOSE: 90 MG/DL (ref 65–125)
HCT VFR BLD CALC: 37.6 % (ref 35–47)
HEMOGLOBIN: 12.3 G/DL (ref 11.9–16)
IMMATURE GRANS (ABS): 0.06 K/UL (ref 0–0.06)
IMMATURE GRANULOCYTES %: 1.2 % (ref 0–2)
LYMPHOCYTES ABSOLUTE: 1.78 K/UL (ref 0.9–5.2)
LYMPHOCYTES RELATIVE PERCENT: 35.3 % (ref 20–45)
MCH RBC QN AUTO: 31.4 PG (ref 26–33)
MCHC RBC AUTO-ENTMCNC: 32.7 G/DL (ref 32–35)
MCV RBC AUTO: 96 FL (ref 75–100)
MONOCYTES ABSOLUTE: 0.53 K/UL (ref 0.1–1)
MONOCYTES RELATIVE PERCENT: 10.5 % (ref 0–13)
NEUTROPHILS ABSOLUTE: 2.5 K/UL (ref 1.9–8)
NEUTROPHILS RELATIVE PERCENT: 49.6 % (ref 45–75)
PDW BLD-RTO: 13.3 % (ref 11.2–14.8)
PLATELET # BLD: 268 THOUS/CMM (ref 140–440)
POTASSIUM SERPL-SCNC: 4.3 MMOL/L (ref 3.5–5.4)
RBC # BLD: 3.92 MILL/CMM (ref 3.8–5.2)
SED RATE, AUTOMATED: 6 MM/HR (ref 0–29)
SODIUM BLD-SCNC: 142 MMOL/L (ref 135–148)
TOTAL PROTEIN: 6.8 G/DL (ref 6–8.3)
WBC # BLD: 5 THDS/CMM (ref 3.6–11)

## 2025-05-14 RX ORDER — METHOTREXATE 2.5 MG/1
10 TABLET ORAL WEEKLY
Qty: 48 TABLET | Refills: 0 | Status: SHIPPED | OUTPATIENT
Start: 2025-05-14

## 2025-05-16 DIAGNOSIS — M35.9 CONNECTIVE TISSUE DISEASE: ICD-10-CM

## 2025-05-16 RX ORDER — GABAPENTIN 800 MG/1
800 TABLET ORAL 3 TIMES DAILY
Qty: 90 TABLET | Refills: 2 | Status: SHIPPED | OUTPATIENT
Start: 2025-05-16 | End: 2025-08-14

## 2025-05-16 RX ORDER — METHOTREXATE 2.5 MG/1
10 TABLET ORAL WEEKLY
Qty: 48 TABLET | Refills: 0 | OUTPATIENT
Start: 2025-05-16

## 2025-06-03 ENCOUNTER — OFFICE VISIT (OUTPATIENT)
Age: 60
End: 2025-06-03
Payer: COMMERCIAL

## 2025-06-03 VITALS
BODY MASS INDEX: 23.41 KG/M2 | HEART RATE: 89 BPM | SYSTOLIC BLOOD PRESSURE: 130 MMHG | HEIGHT: 64 IN | OXYGEN SATURATION: 99 % | WEIGHT: 137.1 LBS | DIASTOLIC BLOOD PRESSURE: 88 MMHG

## 2025-06-03 DIAGNOSIS — Z51.81 MEDICATION MONITORING ENCOUNTER: ICD-10-CM

## 2025-06-03 DIAGNOSIS — G89.29 CHRONIC BILATERAL LOW BACK PAIN WITH BILATERAL SCIATICA: ICD-10-CM

## 2025-06-03 DIAGNOSIS — M54.42 CHRONIC BILATERAL LOW BACK PAIN WITH BILATERAL SCIATICA: ICD-10-CM

## 2025-06-03 DIAGNOSIS — M79.7 FIBROMYALGIA: ICD-10-CM

## 2025-06-03 DIAGNOSIS — M54.41 CHRONIC BILATERAL LOW BACK PAIN WITH BILATERAL SCIATICA: ICD-10-CM

## 2025-06-03 DIAGNOSIS — I73.00 RAYNAUD'S PHENOMENON WITHOUT GANGRENE: ICD-10-CM

## 2025-06-03 DIAGNOSIS — M35.9 CONNECTIVE TISSUE DISEASE: Primary | ICD-10-CM

## 2025-06-03 PROCEDURE — G8420 CALC BMI NORM PARAMETERS: HCPCS | Performed by: NURSE PRACTITIONER

## 2025-06-03 PROCEDURE — G8427 DOCREV CUR MEDS BY ELIG CLIN: HCPCS | Performed by: NURSE PRACTITIONER

## 2025-06-03 PROCEDURE — 3017F COLORECTAL CA SCREEN DOC REV: CPT | Performed by: NURSE PRACTITIONER

## 2025-06-03 PROCEDURE — 99214 OFFICE O/P EST MOD 30 MIN: CPT | Performed by: NURSE PRACTITIONER

## 2025-06-03 PROCEDURE — 1036F TOBACCO NON-USER: CPT | Performed by: NURSE PRACTITIONER

## 2025-06-03 RX ORDER — OXYBUTYNIN CHLORIDE 5 MG/1
5 TABLET, EXTENDED RELEASE ORAL DAILY
COMMUNITY

## 2025-06-03 ASSESSMENT — ENCOUNTER SYMPTOMS
GASTROINTESTINAL NEGATIVE: 1
EYES NEGATIVE: 1
RESPIRATORY NEGATIVE: 1

## 2025-06-10 ENCOUNTER — TELEPHONE (OUTPATIENT)
Age: 60
End: 2025-06-10

## 2025-06-10 DIAGNOSIS — M35.9 CONNECTIVE TISSUE DISEASE: ICD-10-CM

## 2025-06-10 RX ORDER — METHOTREXATE 2.5 MG/1
TABLET ORAL
Qty: 32 TABLET | Refills: 0 | Status: SHIPPED | OUTPATIENT
Start: 2025-06-10

## 2025-06-10 NOTE — TELEPHONE ENCOUNTER
She was on 8 tablets in 6053-1227, and 3/2024 it was decreased to 6 tablets due to headaches. I have notes on the prescriptions for the 6 tablets once weekly around that timeframe that she was only taking 4 tablets once weekly. The last visit w/ Dr. Jacome does mention increasing it, but still only 4 once weekly was sent in. Has she been taking the 8 since February or has it been for years?

## 2025-06-10 NOTE — TELEPHONE ENCOUNTER
Patient called stating she has been taking methotrexate 8 tablets weekly for a long time, she is not sure how long but feels she has always been taking 8 tablets weekly. She picked up a new methotrexate script and noticed she is only suppose to be taking methotrexate 4 tablets by mouth weekly. Patient feels the methotrexate 8 tablets weekly is helping. She feels the methotrexate 4 tablets weekly will not help her. Please advise. Thank you.

## 2025-06-10 NOTE — TELEPHONE ENCOUNTER
Spoke with patient who stated she does not remember ever taking methotrexate 6 tabs weekly, she feels she has been taking methotrexate 8 tabs weekly for months to years. Thank you.

## 2025-07-02 DIAGNOSIS — M35.9 CONNECTIVE TISSUE DISEASE: ICD-10-CM

## 2025-07-02 RX ORDER — METHOTREXATE 2.5 MG/1
TABLET ORAL
Qty: 32 TABLET | Refills: 0 | OUTPATIENT
Start: 2025-07-02

## 2025-07-11 LAB
ALBUMIN: 4.3 G/DL (ref 3.5–5.2)
ALK PHOSPHATASE: 84 U/L (ref 30–121)
ALT SERPL-CCNC: 12 U/L (ref 5–33)
ANION GAP SERPL CALCULATED.3IONS-SCNC: 12 MMOL/L (ref 7–16)
AST SERPL-CCNC: 16 U/L (ref 9–40)
BASOPHILS ABSOLUTE: 0.04 K/UL (ref 0–0.2)
BASOPHILS RELATIVE PERCENT: 0.7 % (ref 0–2)
BILIRUB SERPL-MCNC: 0.2 MG/DL
BUN BLDV-MCNC: 9 MG/DL (ref 6–20)
C-REACTIVE PROTEIN: 0.7 MG/DL
CALCIUM SERPL-MCNC: 9.1 MG/DL (ref 8.6–10.5)
CHLORIDE BLD-SCNC: 104 MMOL/L (ref 96–107)
CO2: 26 MMOL/L (ref 18–32)
CREAT SERPL-MCNC: 0.7 MG/DL (ref 0.51–1.15)
EGFR IF NONAFRICAN AMERICAN: 100 ML/MIN/1.73M2
EOSINOPHILS ABSOLUTE: 0.1 K/UL (ref 0–0.8)
EOSINOPHILS RELATIVE PERCENT: 1.8 % (ref 0–5)
GLUCOSE: 91 MG/DL (ref 65–125)
HCT VFR BLD CALC: 37.4 % (ref 35–47)
HEMOGLOBIN: 12.2 G/DL (ref 11.9–16)
IMMATURE GRANS (ABS): 0.03 K/UL (ref 0–0.06)
IMMATURE GRANULOCYTES %: 0.5 % (ref 0–2)
LYMPHOCYTES ABSOLUTE: 1.95 K/UL (ref 0.9–5.2)
LYMPHOCYTES RELATIVE PERCENT: 35.3 % (ref 20–45)
MCH RBC QN AUTO: 31.1 PG (ref 26–33)
MCHC RBC AUTO-ENTMCNC: 32.6 G/DL (ref 32–35)
MCV RBC AUTO: 95 FL (ref 75–100)
MONOCYTES ABSOLUTE: 0.42 K/UL (ref 0.1–1)
MONOCYTES RELATIVE PERCENT: 7.6 % (ref 0–13)
NEUTROPHILS ABSOLUTE: 2.99 K/UL (ref 1.9–8)
NEUTROPHILS RELATIVE PERCENT: 54.1 % (ref 45–75)
PDW BLD-RTO: 14.2 % (ref 11.2–14.8)
PLATELET # BLD: 269 THOUS/CMM (ref 140–440)
POTASSIUM SERPL-SCNC: 4.6 MMOL/L (ref 3.5–5.4)
RBC # BLD: 3.92 MILL/CMM (ref 3.8–5.2)
SED RATE, AUTOMATED: 8 MM/HR (ref 0–29)
SODIUM BLD-SCNC: 142 MMOL/L (ref 135–148)
TOTAL PROTEIN: 7 G/DL (ref 6–8.3)
WBC # BLD: 5.5 THDS/CMM (ref 3.6–11)

## 2025-07-16 DIAGNOSIS — I73.00 RAYNAUD'S PHENOMENON WITHOUT GANGRENE: ICD-10-CM

## 2025-07-16 RX ORDER — NIFEDIPINE 30 MG/1
30 TABLET, EXTENDED RELEASE ORAL DAILY
Qty: 30 TABLET | Refills: 3 | Status: SHIPPED | OUTPATIENT
Start: 2025-07-16

## 2025-08-19 DIAGNOSIS — M35.9 CONNECTIVE TISSUE DISEASE: ICD-10-CM

## 2025-08-19 LAB
ALBUMIN: 4 G/DL (ref 3.5–5.2)
ALK PHOSPHATASE: 77 U/L (ref 30–121)
ALT SERPL-CCNC: 9 U/L (ref 5–33)
ANION GAP SERPL CALCULATED.3IONS-SCNC: 14 MMOL/L (ref 7–16)
AST SERPL-CCNC: 16 U/L (ref 9–40)
BASOPHILS ABSOLUTE: 0.03 K/UL (ref 0–0.2)
BASOPHILS RELATIVE PERCENT: 0.4 % (ref 0–2)
BILIRUB SERPL-MCNC: 0.4 MG/DL
BUN BLDV-MCNC: 11 MG/DL (ref 6–20)
C-REACTIVE PROTEIN: 0.8 MG/DL
CALCIUM SERPL-MCNC: 9 MG/DL (ref 8.6–10.5)
CHLORIDE BLD-SCNC: 105 MMOL/L (ref 96–107)
CO2: 23 MMOL/L (ref 18–32)
CREAT SERPL-MCNC: 0.77 MG/DL (ref 0.51–1.15)
EGFR IF NONAFRICAN AMERICAN: 89 ML/MIN/1.73M2
EOSINOPHILS ABSOLUTE: 0.15 K/UL (ref 0–0.8)
EOSINOPHILS RELATIVE PERCENT: 1.9 % (ref 0–5)
GLUCOSE: 77 MG/DL (ref 65–125)
HCT VFR BLD CALC: 36.8 % (ref 35–47)
HEMOGLOBIN: 12.2 G/DL (ref 11.9–16)
IMMATURE GRANS (ABS): 0.05 K/UL (ref 0–0.06)
IMMATURE GRANULOCYTES %: 0.6 % (ref 0–2)
LYMPHOCYTES ABSOLUTE: 2.17 K/UL (ref 0.9–5.2)
LYMPHOCYTES RELATIVE PERCENT: 27.3 % (ref 20–45)
MCH RBC QN AUTO: 31.5 PG (ref 26–33)
MCHC RBC AUTO-ENTMCNC: 33.2 G/DL (ref 31–36)
MCV RBC AUTO: 95 FL (ref 75–100)
MONOCYTES ABSOLUTE: 0.58 K/UL (ref 0.1–1)
MONOCYTES RELATIVE PERCENT: 7.3 % (ref 0–13)
NEUTROPHILS ABSOLUTE: 4.98 K/UL (ref 1.9–8)
NEUTROPHILS RELATIVE PERCENT: 62.5 % (ref 45–75)
PDW BLD-RTO: 13.8 % (ref 11.2–14.8)
PLATELET # BLD: 286 THOUS/CMM (ref 140–440)
POTASSIUM SERPL-SCNC: 4.8 MMOL/L (ref 3.5–5.4)
RBC # BLD: 3.87 MILL/CMM (ref 3.8–5.2)
SED RATE, AUTOMATED: 11 MM/HR (ref 0–29)
SODIUM BLD-SCNC: 142 MMOL/L (ref 135–148)
TOTAL PROTEIN: 6.6 G/DL (ref 6–8.3)
WBC # BLD: 8 THDS/CMM (ref 3.6–11)

## 2025-08-19 RX ORDER — METHOTREXATE 2.5 MG/1
TABLET ORAL
Qty: 32 TABLET | Refills: 0 | Status: SHIPPED | OUTPATIENT
Start: 2025-08-19 | End: 2025-08-19 | Stop reason: SDUPTHER